# Patient Record
Sex: FEMALE | Race: WHITE | NOT HISPANIC OR LATINO | Employment: UNEMPLOYED | URBAN - METROPOLITAN AREA
[De-identification: names, ages, dates, MRNs, and addresses within clinical notes are randomized per-mention and may not be internally consistent; named-entity substitution may affect disease eponyms.]

---

## 2017-03-14 ENCOUNTER — OFFICE VISIT (OUTPATIENT)
Dept: FAMILY MEDICINE | Facility: CLINIC | Age: 37
End: 2017-03-14
Payer: COMMERCIAL

## 2017-03-14 VITALS
DIASTOLIC BLOOD PRESSURE: 60 MMHG | SYSTOLIC BLOOD PRESSURE: 100 MMHG | BODY MASS INDEX: 21.22 KG/M2 | OXYGEN SATURATION: 96 % | TEMPERATURE: 98 F | HEIGHT: 64 IN | HEART RATE: 79 BPM | WEIGHT: 124.31 LBS | RESPIRATION RATE: 17 BRPM

## 2017-03-14 DIAGNOSIS — J32.9 SINUSITIS, UNSPECIFIED CHRONICITY, UNSPECIFIED LOCATION: Primary | ICD-10-CM

## 2017-03-14 PROCEDURE — 1160F RVW MEDS BY RX/DR IN RCRD: CPT | Mod: S$GLB,,, | Performed by: NURSE PRACTITIONER

## 2017-03-14 PROCEDURE — 99213 OFFICE O/P EST LOW 20 MIN: CPT | Mod: S$GLB,,, | Performed by: NURSE PRACTITIONER

## 2017-03-14 PROCEDURE — 99999 PR PBB SHADOW E&M-EST. PATIENT-LVL III: CPT | Mod: PBBFAC,,, | Performed by: NURSE PRACTITIONER

## 2017-03-14 RX ORDER — GUAIFENESIN 1200 MG/1
1 TABLET, EXTENDED RELEASE ORAL 2 TIMES DAILY
Qty: 20 TABLET | Refills: 0 | COMMUNITY
Start: 2017-03-14 | End: 2019-04-18

## 2017-03-14 RX ORDER — CLARITHROMYCIN 500 MG/1
500 TABLET, FILM COATED ORAL 2 TIMES DAILY
Qty: 20 TABLET | Refills: 0 | Status: SHIPPED | OUTPATIENT
Start: 2017-03-14 | End: 2017-03-24

## 2017-03-14 RX ORDER — LEVOCETIRIZINE DIHYDROCHLORIDE 5 MG/1
5 TABLET, FILM COATED ORAL NIGHTLY
Qty: 30 TABLET | Refills: 11 | Status: SHIPPED | OUTPATIENT
Start: 2017-03-14 | End: 2018-12-20

## 2017-03-14 NOTE — PROGRESS NOTES
Subjective:       Patient ID: Vandana Cobos is a 36 y.o. female.    Chief Complaint: Sinusitis    HPI Comments: Lipid Panel due on 1981  TETANUS VACCINE due on 10/08/1999  Influenza Vaccine due on 08/01/2016    Past Medical History:  Past Medical History:  No date: ALLERGIC RHINITIS  Past Surgical History:  No date: WISDOM TOOTH EXTRACTION  Review of patient's allergies indicates:  No Known Allergies  Current Outpatient Prescriptions on File Prior to Visit:  fluticasone (FLONASE) 50 mcg/actuation nasal spray, 1 spray by Each Nare route once daily., Disp: 16 g, Rfl: 0  MV-MN/IRON FUM/FA/OMEGA3,6,9#3 (WOMEN'S MULTI ORAL), Take by mouth., Disp: , Rfl:     No current facility-administered medications on file prior to visit.     Social History    Marital status:              Spouse name:                       Years of education:                 Number of children:               Occupational History  Occupation          Employer            Comment               [employer]     Social History Main Topics    Smoking status: Never Smoker                                                                Smokeless status: Never Used                        Alcohol use: No              Drug use: No              Sexual activity: Yes               Partners with: Male       Birth control/protection: None, Condom    Other Topics            Concern    None on file    Social History Narrative    None on file      Review of patient's family history indicates:    Heart failure                  Paternal Grandmother      Stroke                         Paternal Grandmother      Uterine cancer                 Maternal Grandmother      Cancer                         Maternal Grandmother        Comment: uterine    Heart disease                  Paternal Grandfather      Cancer                         Maternal Grandfather        Comment: lung    Glaucoma                       Maternal Grandfather       Breast cancer                  Neg Hx                    Ovarian cancer                 Neg Hx                    Amblyopia                      Neg Hx                    Blindness                      Neg Hx                    Cataracts                      Neg Hx                    Diabetes                       Neg Hx                    Hypertension                   Neg Hx                    Macular degeneration           Neg Hx                    Retinal detachment             Neg Hx                    Strabismus                     Neg Hx                    Thyroid disease                Neg Hx                    No Known Problems              Father                    No Known Problems              Mother                    No Known Problems              Sister                    No Known Problems              Brother                   No Known Problems              Maternal Aunt             No Known Problems              Maternal Uncle            No Known Problems              Paternal Aunt             No Known Problems              Paternal Uncle                  Sinusitis   This is a recurrent (last treated in December and January. ) problem. Episode onset: x 10 days. The problem has been gradually worsening since onset. The maximum temperature recorded prior to her arrival was 100.4 - 100.9 F. The pain is mild. Associated symptoms include congestion, coughing, ear pain, headaches, sinus pressure and sneezing. Pertinent negatives include no chills, diaphoresis, hoarse voice, shortness of breath, sore throat or swollen glands. Treatments tried: zyrtec, dayquil, mucinex. The treatment provided mild relief.     Review of Systems   Constitutional: Negative for chills, diaphoresis, fever and weight loss.   HENT: Positive for congestion, ear pain, postnasal drip, rhinorrhea, sinus pressure and sneezing. Negative for hoarse voice and sore throat.    Respiratory: Positive for cough. Negative for hemoptysis,  shortness of breath and wheezing.    Cardiovascular: Negative.  Negative for chest pain.   Gastrointestinal: Negative.  Negative for heartburn.   Musculoskeletal: Negative for myalgias.   Skin: Negative for rash.   Allergic/Immunologic: Positive for environmental allergies.   Neurological: Positive for headaches.       Objective:      Physical Exam   Constitutional: She is oriented to person, place, and time. No distress.   HENT:   Head: Head is without raccoon's eyes.   Right Ear: A middle ear effusion is present.   Left Ear: A middle ear effusion is present.   Nose: Mucosal edema and rhinorrhea present. Right sinus exhibits maxillary sinus tenderness. Right sinus exhibits no frontal sinus tenderness. Left sinus exhibits maxillary sinus tenderness. Left sinus exhibits no frontal sinus tenderness.   Mouth/Throat: Uvula is midline. No dental abscesses or dental caries. Posterior oropharyngeal erythema present.   Eyes: Pupils are equal, round, and reactive to light.   Cardiovascular: Normal rate and regular rhythm.  Exam reveals no friction rub.    No murmur heard.  Pulmonary/Chest: Effort normal and breath sounds normal. No respiratory distress. She has no wheezes.   Abdominal: Soft. Bowel sounds are normal. She exhibits no distension. There is no tenderness.   Musculoskeletal: She exhibits no edema.   Neurological: She is alert and oriented to person, place, and time.   Skin: She is not diaphoretic. No erythema. No pallor.   Psychiatric: She has a normal mood and affect. Her behavior is normal.       Assessment:       1. Sinusitis, unspecified chronicity, unspecified location        Plan:       1. Sinusitis, unspecified chronicity, unspecified location  Follow up if not resolving.   - clarithromycin (BIAXIN) 500 MG tablet; Take 1 tablet (500 mg total) by mouth 2 (two) times daily.  Dispense: 20 tablet; Refill: 0  - levocetirizine (XYZAL) 5 MG tablet; Take 1 tablet (5 mg total) by mouth every evening.  Dispense: 30  tablet; Refill: 11

## 2017-03-14 NOTE — MR AVS SNAPSHOT
[facility]  [address]  [facility] [address]  Phone: [phone]  Fax: [phone]                  Vandana Cobos   3/14/2017 9:40 AM   Office Visit    Description:  Female : 1980   Provider:  Sydnee Monteiro NP   Department:  [facility]           Reason for Visit     Sinusitis           Diagnoses this Visit        Comments    Sinusitis, unspecified chronicity, unspecified location    -  Primary            To Do List           Goals (5 Years of Data)     None       These Medications        Disp Refills Start End    clarithromycin (BIAXIN) 500 MG tablet 20 tablet 0 3/14/2017 3/24/2017    Take 1 tablet (500 mg total) by mouth 2 (two) times daily. - Oral    Pharmacy: [facility] [address] [place] [address] [id number] [place] [address] Ph #: [phone]       levocetirizine (XYZAL) 5 MG tablet 30 tablet 11 3/14/2017 3/14/2018    Take 1 tablet (5 mg total) by mouth every evening. - Oral    Pharmacy: [facility] [address] [place] [id number] [facility] Ph #: [phone]         PURCHASE these Medications (No prescription required)        Start End    guaifenesin (MUCINEX) 1,200 mg Ta12 3/14/2017     Sig - Route: Take 1 tablet by mouth 2 (two) times daily. - Oral    Class: OTC      [facility]     [facility] Nurse Care Line -  Assistance  Registered nurses in the [facility] Call Center provide clinical advisement, health education, appointment booking, and other advisory services.  Call for this free service at [phone].             Medications           Message regarding Medications     Verify the changes and/or additions to your medication regime listed below are the same as discussed with your clinician today.  If any of these changes or additions are incorrect, please notify your healthcare provider.        START taking these NEW medications        Refills    clarithromycin (BIAXIN) 500 MG tablet 0    Sig: Take 1 tablet (500 mg  "total) by mouth 2 (two) times daily.    Class: Normal    Route: Oral    levocetirizine (XYZAL) 5 MG tablet 11    Sig: Take 1 tablet (5 mg total) by mouth every evening.    Class: Normal    Route: Oral    guaifenesin (MUCINEX) 1,200 mg Ta12 0    Sig: Take 1 tablet by mouth 2 (two) times daily.    Class: OTC    Route: Oral           Verify that the below list of medications is an accurate representation of the medications you are currently taking.  If none reported, the list may be blank. If incorrect, please contact your healthcare provider. Carry this list with you in case of emergency.           Current Medications     fluticasone (FLONASE) 50 mcg/actuation nasal spray 1 spray by Each Nare route once daily.    MV-MN/IRON FUM/FA/OMEGA3,6,9#3 (WOMEN'S MULTI ORAL) Take by mouth.    clarithromycin (BIAXIN) 500 MG tablet Take 1 tablet (500 mg total) by mouth 2 (two) times daily.    guaifenesin (MUCINEX) 1,200 mg Ta12 Take 1 tablet by mouth 2 (two) times daily.    levocetirizine (XYZAL) 5 MG tablet Take 1 tablet (5 mg total) by mouth every evening.           Clinical Reference Information           Your Vitals Were     BP Pulse Temp Resp Height Weight    100/60 (BP Location: Left arm, Patient Position: Sitting, BP Method: Manual) 79 97.8 °F (36.6 °C) 17 5' 4" (1.626 m) 56.4 kg (124 lb 5.4 oz)    SpO2 BMI             96% 21.34 kg/m2         Blood Pressure          Most Recent Value    BP  100/60      Allergies as of 3/14/2017     No Known Allergies      Immunizations Administered on Date of Encounter - 3/14/2017     None      Language Assistance Services     ATTENTION: Language assistance services are available, free of charge. Please call [phone].      ATENCIÓN: Si johnla munir, tiene a hammond disposición servicios gratuitos de asistencia lingüística. Llame al [phone].     CHÚ Ý: N?u b?n nói Ti?ng Vi?t, có các d?ch v? h? tr? ngôn ng? mi?n phí dành cho b?n. G?i s? [phone].         [facility] complies with applicable Federal civil rights laws and does not discriminate on the basis of race, color, national origin, age, disability, or sex.

## 2017-03-31 ENCOUNTER — HOSPITAL ENCOUNTER (OUTPATIENT)
Dept: RADIOLOGY | Facility: HOSPITAL | Age: 37
Discharge: HOME | End: 2017-03-31
Attending: NURSE PRACTITIONER
Payer: COMMERCIAL

## 2017-03-31 ENCOUNTER — OFFICE VISIT (OUTPATIENT)
Dept: FAMILY MEDICINE | Facility: CLINIC | Age: 37
End: 2017-03-31
Payer: COMMERCIAL

## 2017-03-31 VITALS
HEIGHT: 64 IN | DIASTOLIC BLOOD PRESSURE: 48 MMHG | SYSTOLIC BLOOD PRESSURE: 94 MMHG | HEART RATE: 80 BPM | RESPIRATION RATE: 16 BRPM | BODY MASS INDEX: 20.85 KG/M2 | TEMPERATURE: 99 F | WEIGHT: 122.13 LBS

## 2017-03-31 DIAGNOSIS — J40 BRONCHITIS: Primary | ICD-10-CM

## 2017-03-31 DIAGNOSIS — J40 BRONCHITIS: ICD-10-CM

## 2017-03-31 PROCEDURE — 1160F RVW MEDS BY RX/DR IN RCRD: CPT | Mod: S$GLB,,, | Performed by: NURSE PRACTITIONER

## 2017-03-31 PROCEDURE — 71020 XR CHEST PA AND LATERAL: CPT | Mod: 26,,, | Performed by: RADIOLOGY

## 2017-03-31 PROCEDURE — 99999 PR PBB SHADOW E&M-EST. PATIENT-LVL III: CPT | Mod: PBBFAC,,, | Performed by: NURSE PRACTITIONER

## 2017-03-31 PROCEDURE — 99213 OFFICE O/P EST LOW 20 MIN: CPT | Mod: S$GLB,,, | Performed by: NURSE PRACTITIONER

## 2017-03-31 PROCEDURE — 71020 XR CHEST PA AND LATERAL: CPT | Mod: TC,PO

## 2017-03-31 RX ORDER — ALBUTEROL SULFATE 90 UG/1
2 AEROSOL, METERED RESPIRATORY (INHALATION) EVERY 6 HOURS PRN
Qty: 18 G | Refills: 0 | Status: SHIPPED | OUTPATIENT
Start: 2017-03-31 | End: 2018-08-29

## 2017-03-31 RX ORDER — METHYLPREDNISOLONE 4 MG/1
TABLET ORAL
Qty: 1 PACKAGE | Refills: 0 | Status: SHIPPED | OUTPATIENT
Start: 2017-03-31 | End: 2017-04-21

## 2017-03-31 NOTE — MR AVS SNAPSHOT
[facility]  [address] [place] [address]  Phone: [phone]  Fax: [phone]                  Vandana Cobos   3/31/2017 9:40 AM   Office Visit    Description:  Female : 1980   Provider:  Sydnee Monteiro NP   Department:  [facility]           Reason for Visit     Cough     Nasal Congestion     Chest Congestion     Fever     Generalized Body Aches           Diagnoses this Visit        Comments    Bronchitis    -  Primary            To Do List           Future Appointments        Provider Department Dept Phone    3/31/2017 10:15 AM [facility] [phone]      Goals (5 Years of Data)     None       These Medications        Disp Refills Start End    methylPREDNISolone (MEDROL DOSEPACK) 4 mg tablet 1 Package 0 3/31/2017 2017    use as directed    Pharmacy: [facility] [address] [place] [address] [facility] Ph #: [phone]       albuterol 90 mcg/actuation inhaler 18 g 0 3/31/2017     Inhale 2 puffs into the lungs every 6 (six) hours as needed. - Inhalation    Pharmacy: [facility] [address] [place] [address] [facility] Ph #: [phone]         [facility]     [facility] Nurse Care Line - 24/ Assistance  Unless otherwise directed by your provider, please contact [facility], our nurse care line that is available for 24/7 assistance.     Registered nurses in the [facility] Center provide: appointment scheduling, clinical advisement, health education, and other advisory services.  Call: [phone] (toll free)               Medications           Message regarding Medications     Verify the changes and/or additions to your medication regime listed below are the same as discussed with your clinician today.  If any of these changes or additions are incorrect, please notify your healthcare provider.        START taking these NEW medications        Refills     "methylPREDNISolone (MEDROL DOSEPACK) 4 mg tablet 0    Sig: use as directed    Class: Normal    albuterol 90 mcg/actuation inhaler 0    Sig: Inhale 2 puffs into the lungs every 6 (six) hours as needed.    Class: Normal    Route: Inhalation           Verify that the below list of medications is an accurate representation of the medications you are currently taking.  If none reported, the list may be blank. If incorrect, please contact your healthcare provider. Carry this list with you in case of emergency.           Current Medications     DM/P-EPHED/ACETAMINOPH/DOXYLAM (VICKS NYQUIL LIQUICAPS ORAL) Take by mouth.    guaifenesin (MUCINEX) 1,200 mg Ta12 Take 1 tablet by mouth 2 (two) times daily.    MV-MN/IRON FUM/FA/OMEGA3,6,9#3 (WOMEN'S MULTI ORAL) Take by mouth.    PSEUDOEPH/DM/GUAIFEN/ACETAMIN (VICKS DAYQUIL LIQUICAPS ORAL) Take by mouth.    albuterol 90 mcg/actuation inhaler Inhale 2 puffs into the lungs every 6 (six) hours as needed.    fluticasone (FLONASE) 50 mcg/actuation nasal spray 1 spray by Each Nare route once daily.    levocetirizine (XYZAL) 5 MG tablet Take 1 tablet (5 mg total) by mouth every evening.    methylPREDNISolone (MEDROL DOSEPACK) 4 mg tablet use as directed           Clinical Reference Information           Your Vitals Were     BP Pulse Temp Resp Height Weight    94/48 80 98.9 °F (37.2 °C) (Oral) 16 5' 4" (1.626 m) 55.4 kg (122 lb 2.2 oz)    Last Period BMI             03/08/2017 20.96 kg/m2         Blood Pressure          Most Recent Value    BP  (!)  94/48      Allergies as of 3/31/2017     No Known Allergies      Immunizations Administered on Date of Encounter - 3/31/2017     None      Orders Placed During Today's Visit     Future Labs/Procedures Expected by Expires    X-Ray Chest PA And Lateral  3/31/2017 3/31/2018      Language Assistance Services     ATTENTION: Language assistance services are available, free of charge. Please call [phone].      ATENCIÓN: Si shefali mejia " a hammond disposición servicios gratuitos de asistencia lingüística. Lllorranie al [phone].     MARY Ý: N?u b?n nói Ti?ng Vi?t, có các d?ch v? h? tr? ngôn ng? mi?n phí dành cho b?n. G?i s? [phone].         [facility] complies with applicable Federal civil rights laws and does not discriminate on the basis of race, color, national origin, age, disability, or sex.

## 2017-03-31 NOTE — PROGRESS NOTES
Subjective:       Patient ID: Vandana Cobos is a 36 y.o. female.    Chief Complaint: Cough; Nasal Congestion (patient took dayquil ); Chest Congestion; Fever; and Generalized Body Aches    HPI Comments: Patient was treated on 3/17/17 with biaxin and xyzal. Sinus symptoms improved but the cough did not resolve. Yesterday she started to have low grade fever and worsening cough.  Having a productive cough and chest tightness. She is currently taking dayquil and nyquil and mucinex.  Lipid Panel due on 1981  TETANUS VACCINE due on 10/08/1999  Influenza Vaccine due on 08/01/2016    Past Medical History:  Past Medical History:  No date: ALLERGIC RHINITIS  Past Surgical History:  No date: WISDOM TOOTH EXTRACTION  Review of patient's allergies indicates:  No Known Allergies  Current Outpatient Prescriptions on File Prior to Visit:  guaifenesin (MUCINEX) 1,200 mg Ta12, Take 1 tablet by mouth 2 (two) times daily., Disp: 20 tablet, Rfl: 0  MV-MN/IRON FUM/FA/OMEGA3,6,9#3 (WOMEN'S MULTI ORAL), Take by mouth., Disp: , Rfl:   fluticasone (FLONASE) 50 mcg/actuation nasal spray, 1 spray by Each Nare route once daily., Disp: 16 g, Rfl: 0  levocetirizine (XYZAL) 5 MG tablet, Take 1 tablet (5 mg total) by mouth every evening., Disp: 30 tablet, Rfl: 11    No current facility-administered medications on file prior to visit.     Social History    Marital status:              Spouse name:                       Years of education:                 Number of children:               Occupational History  Occupation          Employer            Comment               [employer]* [employer]*     Social History Main Topics    Smoking status: Never Smoker                                                                Smokeless status: Never Used                        Alcohol use: No              Drug use: No              Sexual activity: Yes               Partners with: Male       Birth control/protection: None,  Condom    Other Topics            Concern    None on file    Social History Narrative    None on file      Review of patient's family history indicates:    Heart failure                  Paternal Grandmother      Stroke                         Paternal Grandmother      Uterine cancer                 Maternal Grandmother      Cancer                         Maternal Grandmother        Comment: uterine    Heart disease                  Paternal Grandfather      Cancer                         Maternal Grandfather        Comment: lung    Glaucoma                       Maternal Grandfather      Breast cancer                  Neg Hx                    Ovarian cancer                 Neg Hx                    Amblyopia                      Neg Hx                    Blindness                      Neg Hx                    Cataracts                      Neg Hx                    Diabetes                       Neg Hx                    Hypertension                   Neg Hx                    Macular degeneration           Neg Hx                    Retinal detachment             Neg Hx                    Strabismus                     Neg Hx                    Thyroid disease                Neg Hx                    No Known Problems              Father                    No Known Problems              Mother                    No Known Problems              Sister                    No Known Problems              Brother                   No Known Problems              Maternal Aunt             No Known Problems              Maternal Uncle            No Known Problems              Paternal Aunt             No Known Problems              Paternal Uncle                  Review of Systems   Constitutional: Positive for chills, diaphoresis, fatigue and fever.   HENT: Positive for postnasal drip and sore throat. Negative for rhinorrhea, sinus pressure and trouble swallowing.    Respiratory: Positive for cough and chest tightness.  Negative for apnea, choking, shortness of breath, wheezing and stridor.    Cardiovascular: Negative.    Gastrointestinal: Positive for diarrhea. Negative for abdominal pain, constipation, nausea and vomiting.   Neurological: Negative for headaches.       Objective:      Physical Exam   Constitutional: She is oriented to person, place, and time. No distress.   HENT:   Head: Normocephalic and atraumatic. Head is without raccoon's eyes.   Right Ear: No middle ear effusion.   Left Ear:  No middle ear effusion.   Nose: Rhinorrhea present. No mucosal edema. Right sinus exhibits no maxillary sinus tenderness and no frontal sinus tenderness. Left sinus exhibits no maxillary sinus tenderness and no frontal sinus tenderness.   Eyes: Pupils are equal, round, and reactive to light.   Neck: Normal range of motion.   Cardiovascular: Normal rate and regular rhythm.  Exam reveals no friction rub.    No murmur heard.  Pulmonary/Chest: Effort normal and breath sounds normal. No respiratory distress. She has no wheezes.   Abdominal: Soft. Bowel sounds are normal. She exhibits no distension. There is no tenderness.   Musculoskeletal: She exhibits no edema.   Neurological: She is alert and oriented to person, place, and time.   Skin: She is not diaphoretic. No erythema.   Psychiatric: She has a normal mood and affect. Her behavior is normal.   Vitals reviewed.      Assessment:       1. Bronchitis        Plan:       1. Bronchitis  Continue flonase, continue mucinex, follow up if not resolving.   - X-Ray Chest PA And Lateral; Future  - methylPREDNISolone (MEDROL DOSEPACK) 4 mg tablet; use as directed  Dispense: 1 Package; Refill: 0  - albuterol 90 mcg/actuation inhaler; Inhale 2 puffs into the lungs every 6 (six) hours as needed.  Dispense: 18 g; Refill: 0

## 2017-04-04 ENCOUNTER — TELEPHONE (OUTPATIENT)
Dept: FAMILY MEDICINE | Facility: CLINIC | Age: 37
End: 2017-04-04

## 2017-04-04 NOTE — TELEPHONE ENCOUNTER
Pt has seen angela the last couple of visits for sinusitis. Was put on biaxin the first time and then on steroid pack she only wants to see a md.   i offered her an appointment with dr. Mendieta in [place] she could not make that time. She said she now has goop in the corner of her eyes and her eyes are pink. I made her an appointment with you for tomorrow.   She has another day of steroid pack left.   She is tearful on the phone. She said that both of her children have been sick with the h flu. They are better.

## 2017-04-04 NOTE — TELEPHONE ENCOUNTER
----- Message from Janet Redman sent at 4/4/2017  7:06 AM CDT -----  Contact: self    [phone]  Patient requesting to see the doctor only for poss eye infection.Patient saw the NP twice is not feeling better.  Thanks!

## 2017-04-05 ENCOUNTER — OFFICE VISIT (OUTPATIENT)
Dept: FAMILY MEDICINE | Facility: CLINIC | Age: 37
End: 2017-04-05
Payer: COMMERCIAL

## 2017-04-05 VITALS
SYSTOLIC BLOOD PRESSURE: 96 MMHG | BODY MASS INDEX: 20.67 KG/M2 | WEIGHT: 121.06 LBS | HEIGHT: 64 IN | RESPIRATION RATE: 16 BRPM | DIASTOLIC BLOOD PRESSURE: 58 MMHG | HEART RATE: 67 BPM

## 2017-04-05 DIAGNOSIS — J32.9 SINUSITIS, UNSPECIFIED CHRONICITY, UNSPECIFIED LOCATION: Primary | ICD-10-CM

## 2017-04-05 DIAGNOSIS — H10.9 CONJUNCTIVITIS OF RIGHT EYE, UNSPECIFIED CONJUNCTIVITIS TYPE: ICD-10-CM

## 2017-04-05 PROCEDURE — 99999 PR PBB SHADOW E&M-EST. PATIENT-LVL III: CPT | Mod: PBBFAC,,, | Performed by: FAMILY MEDICINE

## 2017-04-05 PROCEDURE — 1160F RVW MEDS BY RX/DR IN RCRD: CPT | Mod: S$GLB,,, | Performed by: FAMILY MEDICINE

## 2017-04-05 PROCEDURE — 99213 OFFICE O/P EST LOW 20 MIN: CPT | Mod: S$GLB,,, | Performed by: FAMILY MEDICINE

## 2017-04-05 RX ORDER — AMOXICILLIN AND CLAVULANATE POTASSIUM 875; 125 MG/1; MG/1
1 TABLET, FILM COATED ORAL 2 TIMES DAILY
Qty: 20 TABLET | Refills: 0 | Status: SHIPPED | OUTPATIENT
Start: 2017-04-05 | End: 2017-10-23 | Stop reason: ALTCHOICE

## 2017-04-05 RX ORDER — TOBRAMYCIN 3 MG/ML
1 SOLUTION/ DROPS OPHTHALMIC 4 TIMES DAILY
Qty: 5 ML | Refills: 0 | Status: SHIPPED | OUTPATIENT
Start: 2017-04-05 | End: 2017-04-12

## 2017-04-05 NOTE — PROGRESS NOTES
Subjective:     THIS DOCUMENT WAS MADE IN PART WITH [employer]ATION SOFTWARE.  OCCASIONALLY THIS SOFTWARE WILL MISINTERPRET WORDS OR PHRASES.   Patient ID: Vandana Cboos is a 36 y.o. female.    Chief Complaint: Conjunctivitis (right eye); Cough; and Sore Throat    HPI     Recurring sinus infections since December    Most recent last Thursday fever (subjective), body aches, headaches  Mostly chest congestion and coughing  Right eye red with purulent drainage  Dayquil and mucinex  Completing steroid pack today  Kids sick suspect H.flu with OM and conjunctivitis    She's also had some cough and chest congestion with green and yellow sputum.    She does have an underlying history of ALLERGIES.  She has been taken and histamine but has not been using Flonase.    Active Ambulatory Problems     Diagnosis Date Noted    Ovarian cyst, left 10/11/2012    Anemia 11/07/2012    UTI (lower urinary tract infection)-lacto first trimester  11/07/2012    Glaucoma, suspect - Both Eyes 08/29/2013    Breast mass, right 09/04/2013     Resolved Ambulatory Problems     Diagnosis Date Noted    Normal pregnancy, first 10/11/2012    Large for gestational age- (7lbs 8 oz on usg 5/22) 04/26/2013    GBS (group B Streptococcus carrier), +RV culture, currently pregnant 05/15/2013     Past Medical History:   Diagnosis Date    ALLERGIC RHINITIS          Review of Systems   Constitutional: Positive for fatigue and fever.   HENT: Positive for congestion, postnasal drip, sinus pressure and sneezing.    Respiratory: Positive for cough.    Cardiovascular: Negative for chest pain.   Genitourinary: Negative.    Musculoskeletal: Negative.        Objective:      Physical Exam   Constitutional: She is oriented to person, place, and time. She appears well-developed and well-nourished.   HENT:   Head: Normocephalic and atraumatic. Head is without right periorbital erythema and without left periorbital erythema.   Right Ear: Tympanic membrane, external  ear and ear canal normal. No drainage.   Left Ear: Tympanic membrane, external ear and ear canal normal. No drainage.   Nose: Mucosal edema (Inflamed turbinates) present.   Mouth/Throat: Uvula is midline and oropharynx is clear and moist. No oropharyngeal exudate or posterior oropharyngeal erythema.   Eyes: EOM are normal. Pupils are equal, round, and reactive to light. Left eye exhibits no discharge. Right conjunctiva is injected. Left conjunctiva is not injected. No scleral icterus.   Neck: Normal range of motion. Neck supple. No tracheal deviation present. No thyromegaly present.   Cardiovascular: Normal rate, regular rhythm and normal heart sounds.  Exam reveals no gallop and no friction rub.    No murmur heard.  Pulmonary/Chest: Effort normal and breath sounds normal. No stridor. No respiratory distress. She has no wheezes. She has no rales.   Lymphadenopathy:     She has no cervical adenopathy.   Neurological: She is alert and oriented to person, place, and time.   Skin: Skin is warm and dry. She is not diaphoretic.   Psychiatric: She has a normal mood and affect. Her behavior is normal.   Vitals reviewed.      Assessment:       1. Sinusitis, unspecified chronicity, unspecified location    2. Conjunctivitis of right eye, unspecified conjunctivitis type        Plan:       Vandana was seen today for conjunctivitis, cough and sore throat.    Diagnoses and all orders for this visit:    Sinusitis, unspecified chronicity, unspecified location  -     amoxicillin-clavulanate 875-125mg (AUGMENTIN) 875-125 mg per tablet; Take 1 tablet by mouth 2 (two) times daily.    Conjunctivitis of right eye, unspecified conjunctivitis type  -     tobramycin sulfate 0.3% (TOBREX) 0.3 % ophthalmic solution; Place 1 drop into both eyes 4 (four) times daily.     restart Flonase for her ALLERGIES

## 2017-04-06 ENCOUNTER — PATIENT MESSAGE (OUTPATIENT)
Dept: FAMILY MEDICINE | Facility: CLINIC | Age: 37
End: 2017-04-06

## 2017-04-15 ENCOUNTER — PATIENT MESSAGE (OUTPATIENT)
Dept: FAMILY MEDICINE | Facility: CLINIC | Age: 37
End: 2017-04-15

## 2017-05-15 ENCOUNTER — PATIENT MESSAGE (OUTPATIENT)
Dept: FAMILY MEDICINE | Facility: CLINIC | Age: 37
End: 2017-05-15

## 2017-05-16 RX ORDER — ESCITALOPRAM OXALATE 5 MG/1
5 TABLET ORAL DAILY
Qty: 30 TABLET | Refills: 2 | Status: SHIPPED | OUTPATIENT
Start: 2017-05-16 | End: 2017-08-07 | Stop reason: SDUPTHER

## 2017-08-07 ENCOUNTER — PATIENT MESSAGE (OUTPATIENT)
Dept: FAMILY MEDICINE | Facility: CLINIC | Age: 37
End: 2017-08-07

## 2017-08-07 RX ORDER — ESCITALOPRAM OXALATE 10 MG/1
10 TABLET ORAL DAILY
Qty: 30 TABLET | Refills: 2 | Status: SHIPPED | OUTPATIENT
Start: 2017-08-07 | End: 2017-10-23 | Stop reason: SDUPTHER

## 2017-10-23 ENCOUNTER — OFFICE VISIT (OUTPATIENT)
Dept: FAMILY MEDICINE | Facility: CLINIC | Age: 37
End: 2017-10-23
Payer: COMMERCIAL

## 2017-10-23 ENCOUNTER — LAB VISIT (OUTPATIENT)
Dept: LAB | Facility: HOSPITAL | Age: 37
End: 2017-10-23
Attending: FAMILY MEDICINE
Payer: COMMERCIAL

## 2017-10-23 VITALS
HEART RATE: 76 BPM | WEIGHT: 126.56 LBS | DIASTOLIC BLOOD PRESSURE: 58 MMHG | BODY MASS INDEX: 21.61 KG/M2 | SYSTOLIC BLOOD PRESSURE: 90 MMHG | HEIGHT: 64 IN | TEMPERATURE: 98 F

## 2017-10-23 DIAGNOSIS — L65.9 HAIR LOSS: ICD-10-CM

## 2017-10-23 DIAGNOSIS — M79.672 PAIN OF LEFT HEEL: ICD-10-CM

## 2017-10-23 DIAGNOSIS — Z83.49 FAMILY HISTORY OF THYROID DISEASE: ICD-10-CM

## 2017-10-23 DIAGNOSIS — F41.9 ANXIETY: ICD-10-CM

## 2017-10-23 DIAGNOSIS — J30.9 ALLERGIC RHINITIS, UNSPECIFIED CHRONICITY, UNSPECIFIED SEASONALITY, UNSPECIFIED TRIGGER: Primary | ICD-10-CM

## 2017-10-23 LAB — TSH SERPL DL<=0.005 MIU/L-ACNC: 2.42 UIU/ML

## 2017-10-23 PROCEDURE — 84443 ASSAY THYROID STIM HORMONE: CPT

## 2017-10-23 PROCEDURE — 90471 IMMUNIZATION ADMIN: CPT | Mod: S$GLB,,, | Performed by: FAMILY MEDICINE

## 2017-10-23 PROCEDURE — 99214 OFFICE O/P EST MOD 30 MIN: CPT | Mod: 25,S$GLB,, | Performed by: FAMILY MEDICINE

## 2017-10-23 PROCEDURE — 36415 COLL VENOUS BLD VENIPUNCTURE: CPT | Mod: PO

## 2017-10-23 PROCEDURE — 90686 IIV4 VACC NO PRSV 0.5 ML IM: CPT | Mod: S$GLB,,, | Performed by: FAMILY MEDICINE

## 2017-10-23 PROCEDURE — 99999 PR PBB SHADOW E&M-EST. PATIENT-LVL III: CPT | Mod: PBBFAC,,, | Performed by: FAMILY MEDICINE

## 2017-10-23 RX ORDER — ESCITALOPRAM OXALATE 20 MG/1
20 TABLET ORAL DAILY
Qty: 30 TABLET | Refills: 4 | Status: SHIPPED | OUTPATIENT
Start: 2017-10-23 | End: 2018-05-22 | Stop reason: SDUPTHER

## 2017-10-23 NOTE — PROGRESS NOTES
Subjective:       Patient ID: Vandana Cobos is a 37 y.o. female.    Chief Complaint: Thyroid Problem (pt c/o hair loss for last month and history of thyroid problems in family); Sinus Problem (pt ststaes she had a cold last week and is still experiencing sinus issues); and Foot Injury (pt states she has been having pain L heel lately)    HPI   Sinus congestion, sinus pressure, postnasal drainage.  No fever.  Kids have been sick with similar.    Family history of hypothyroidism.  Has noticed some hair loss recently, appears to be increased shedding diffusely.    Anxiety, remains on Lexapro 10 mg for about 4 weeks.  She noticed improvement when he first began with 5 mg but little change when increasing from 5-10.  No side effects.  No depression.  There has been an increase in stress, recent, but no emergencies here.    Left heel pain for several weeks.  Pain most prominent when she first stands up in the morning and then eases off as she walks around.  No injury or trauma.  Pain is over the bottom of the heel and medial heel.  No pain posteriorly near the Achilles    Active Ambulatory Problems     Diagnosis Date Noted    Ovarian cyst, left 10/11/2012    Anemia 11/07/2012    UTI (lower urinary tract infection)-lacto first trimester  11/07/2012    Glaucoma, suspect - Both Eyes 08/29/2013    Breast mass, right 09/04/2013     Resolved Ambulatory Problems     Diagnosis Date Noted    Normal pregnancy, first 10/11/2012    Large for gestational age- (7lbs 8 oz on usg 5/22) 04/26/2013    GBS (group B Streptococcus carrier), +RV culture, currently pregnant 05/15/2013     Past Medical History:   Diagnosis Date    ALLERGIC RHINITIS          Review of Systems   Constitutional: Positive for fatigue. Negative for fever.   HENT: Positive for congestion, postnasal drip, rhinorrhea, sinus pain and sinus pressure. Negative for sore throat.    Respiratory: Negative for cough, shortness of breath and wheezing.     Psychiatric/Behavioral: Negative for dysphoric mood. The patient is nervous/anxious.        Objective:      Physical Exam   Constitutional: She is oriented to person, place, and time. She appears well-developed and well-nourished.   HENT:   Head: Normocephalic and atraumatic. Head is without right periorbital erythema and without left periorbital erythema.   Right Ear: Tympanic membrane, external ear and ear canal normal. No drainage.   Left Ear: Tympanic membrane, external ear and ear canal normal. No drainage.   Nose: No mucosal edema (Boggy swollen turbinates) or rhinorrhea (Clear).   Mouth/Throat: Uvula is midline and oropharynx is clear and moist. No oropharyngeal exudate or posterior oropharyngeal erythema.   Enlarged boggy turbinates, clear rhinorrhea.   Eyes: Conjunctivae and EOM are normal. Pupils are equal, round, and reactive to light. Right eye exhibits no discharge. Left eye exhibits no discharge. No scleral icterus.   Neck: Normal range of motion. Neck supple. No tracheal deviation present. No thyromegaly present.   Cardiovascular: Normal rate, regular rhythm and normal heart sounds.  Exam reveals no gallop and no friction rub.    No murmur heard.  Pulmonary/Chest: Effort normal and breath sounds normal. No stridor. No respiratory distress. She has no wheezes. She has no rales.   Lymphadenopathy:     She has no cervical adenopathy.   Neurological: She is alert and oriented to person, place, and time. She has normal reflexes. No cranial nerve deficit.   Skin: Skin is warm and dry. She is not diaphoretic.   Psychiatric: She has a normal mood and affect. Her behavior is normal.   Vitals reviewed.      Assessment:       1. Allergic rhinitis, unspecified chronicity, unspecified seasonality, unspecified trigger    2. Hair loss    3. Family history of thyroid disease    4. Anxiety        Plan:       Vandana was seen today for thyroid problem, sinus problem and foot injury.    Diagnoses and all orders for  this visit:    Allergic rhinitis, unspecified chronicity, unspecified seasonality, unspecified trigger  Continue Xyzal, restart Flonase.  2 sprays each nostril twice a day for the first week then once a day.  She is at risk for developing a sinus infection, but I don't recommend antibiotics just yet.  I did ask her to contact me over the next week if not improving.    Hair loss  -     TSH; Future    Family history of thyroid disease  -     TSH; Future    Anxiety  Initially mild benefit from the Lexapro 5 mg.  No additional benefit when increased to 10.  We'll increase to 20 mg.    Pain of left heel  She does have some pain near the insertion of the plantar fascia but also has some medial heel pain which may or may not be related.  Recommend ice, stretching, Naprosyn for one week.  If not improving consider podiatry   Other orders  -     escitalopram oxalate (LEXAPRO) 20 MG tablet; Take 1 tablet (20 mg total) by mouth once daily.  -     Influenza - Quadrivalent (3 years & older) (PF)

## 2017-10-24 ENCOUNTER — TELEPHONE (OUTPATIENT)
Dept: OPHTHALMOLOGY | Facility: CLINIC | Age: 37
End: 2017-10-24

## 2017-11-13 ENCOUNTER — TELEPHONE (OUTPATIENT)
Dept: FAMILY MEDICINE | Facility: CLINIC | Age: 37
End: 2017-11-13

## 2017-11-14 ENCOUNTER — OFFICE VISIT (OUTPATIENT)
Dept: FAMILY MEDICINE | Facility: CLINIC | Age: 37
End: 2017-11-14
Payer: COMMERCIAL

## 2017-11-14 VITALS
SYSTOLIC BLOOD PRESSURE: 104 MMHG | WEIGHT: 125.13 LBS | HEIGHT: 64 IN | OXYGEN SATURATION: 97 % | HEART RATE: 74 BPM | BODY MASS INDEX: 21.36 KG/M2 | TEMPERATURE: 99 F | DIASTOLIC BLOOD PRESSURE: 62 MMHG

## 2017-11-14 DIAGNOSIS — F41.9 ANXIETY: ICD-10-CM

## 2017-11-14 DIAGNOSIS — J01.90 ACUTE NON-RECURRENT SINUSITIS, UNSPECIFIED LOCATION: Primary | ICD-10-CM

## 2017-11-14 DIAGNOSIS — J30.9 ALLERGIC RHINITIS, UNSPECIFIED CHRONICITY, UNSPECIFIED SEASONALITY, UNSPECIFIED TRIGGER: ICD-10-CM

## 2017-11-14 PROCEDURE — 99999 PR PBB SHADOW E&M-EST. PATIENT-LVL III: CPT | Mod: PBBFAC,,, | Performed by: FAMILY MEDICINE

## 2017-11-14 PROCEDURE — 99214 OFFICE O/P EST MOD 30 MIN: CPT | Mod: S$GLB,,, | Performed by: FAMILY MEDICINE

## 2017-11-14 RX ORDER — AZITHROMYCIN 250 MG/1
TABLET, FILM COATED ORAL
Qty: 6 TABLET | Refills: 0 | Status: SHIPPED | OUTPATIENT
Start: 2017-11-14 | End: 2017-11-19

## 2017-11-14 RX ORDER — MOMETASONE FUROATE 50 UG/1
2 SPRAY, METERED NASAL DAILY
Qty: 17 G | Refills: 2 | Status: SHIPPED | OUTPATIENT
Start: 2017-11-14 | End: 2017-12-06

## 2017-12-06 ENCOUNTER — TELEPHONE (OUTPATIENT)
Dept: OPTOMETRY | Facility: CLINIC | Age: 37
End: 2017-12-06

## 2017-12-06 ENCOUNTER — OFFICE VISIT (OUTPATIENT)
Dept: OPTOMETRY | Facility: CLINIC | Age: 37
End: 2017-12-06
Payer: COMMERCIAL

## 2017-12-06 DIAGNOSIS — H40.023 OPEN ANGLE WITH BORDERLINE FINDINGS, HIGH RISK, BILATERAL: Primary | ICD-10-CM

## 2017-12-06 DIAGNOSIS — Z46.0 FITTING AND ADJUSTMENT OF SPECTACLES AND CONTACT LENSES: Primary | ICD-10-CM

## 2017-12-06 DIAGNOSIS — H52.13 MYOPIA OF BOTH EYES: ICD-10-CM

## 2017-12-06 PROCEDURE — 92310 CONTACT LENS FITTING OU: CPT | Mod: ,,, | Performed by: OPTOMETRIST

## 2017-12-06 PROCEDURE — 99999 PR PBB SHADOW E&M-EST. PATIENT-LVL II: CPT | Mod: PBBFAC,,, | Performed by: OPTOMETRIST

## 2017-12-06 PROCEDURE — 92015 DETERMINE REFRACTIVE STATE: CPT | Mod: S$GLB,,, | Performed by: OPTOMETRIST

## 2017-12-06 PROCEDURE — 99499 UNLISTED E&M SERVICE: CPT | Mod: S$GLB,,, | Performed by: OPTOMETRIST

## 2017-12-06 PROCEDURE — 92014 COMPRE OPH EXAM EST PT 1/>: CPT | Mod: S$GLB,,, | Performed by: OPTOMETRIST

## 2017-12-06 NOTE — TELEPHONE ENCOUNTER
----- Message from Estrella Montero sent at 12/6/2017  7:14 AM CST -----  Patient requesting to speak with nurse concerning possibly rescheduling this morning's 9:00 appointment/stated she will still come to appointment if nothing else available before January/please call patient back at [phone] to advise.

## 2017-12-06 NOTE — PROGRESS NOTES
HPI     Routine eye exam with contacts--dle--5/14/16    Pt denies any changes since last visit-no eye pain, no blurred vision, no   flashes or floaters. Needing updated glasses and contacts rx.     Last edited by Melinda Sears on 12/6/2017  9:28 AM. (History)            Assessment /Plan     For exam results, see Encounter Report.    Open angle with borderline findings, high risk, bilateral    Myopia of both eyes - Both Eyes      1. IOP low and stable, OCT normal, pachy thin, Prev HVF normal. High risk suspect. Fam history, cupping and thin pachy. Still No treat at this time. Prev Gonio open,.  RTC yearly with HVF.   2. Spec Rx given and Contact lens Rx released to pt. Daily wear only advised, with education to risks of extended wear.  Discussed lens care, compliance and solutions. RTC yearly contact lens follow up.   Different lens options discussed with patient. RTC 1 year full exam.

## 2018-01-22 ENCOUNTER — OFFICE VISIT (OUTPATIENT)
Dept: FAMILY MEDICINE | Facility: CLINIC | Age: 38
End: 2018-01-22
Payer: COMMERCIAL

## 2018-01-22 VITALS
BODY MASS INDEX: 21.79 KG/M2 | TEMPERATURE: 99 F | SYSTOLIC BLOOD PRESSURE: 100 MMHG | WEIGHT: 127.63 LBS | HEART RATE: 68 BPM | DIASTOLIC BLOOD PRESSURE: 68 MMHG | HEIGHT: 64 IN

## 2018-01-22 DIAGNOSIS — J06.9 VIRAL URI: Primary | ICD-10-CM

## 2018-01-22 PROCEDURE — 99213 OFFICE O/P EST LOW 20 MIN: CPT | Mod: S$GLB,,, | Performed by: INTERNAL MEDICINE

## 2018-01-22 PROCEDURE — 99999 PR PBB SHADOW E&M-EST. PATIENT-LVL III: CPT | Mod: PBBFAC,,, | Performed by: INTERNAL MEDICINE

## 2018-01-22 NOTE — PATIENT INSTRUCTIONS
You have an upper respiratory infection, also known as a cold. Based on my exam today, I do not think that you need antibiotics, and I do not think that they would help you. This type of infection commonly causes runny nose, nasal congestion, sinus pressure, ear pressure, sore throat, and cough.     To help with these symptoms, there are many medications and other treatments available without a prescription. If you choose to take any over the counter cold medicine, read the label, as they can contain many different medications to help with different symptoms. Make sure that any over the counter cold medications are safe to take with medical conditions that you may have.     You can also try a nasal rinse to help with your runny nose, sinus pressure, and congestion. For nasal rinses, you can buy a kit over the counter (I recommend Absio) that allows you to spray salt water up into your nose and sinuses to rinse out mucus, allergens, and irritants. Follow the instructions on whichever kit you buy, paying attention to using distilled, boiled, or bottled water. Tap water can contain a parasite that it dangerous to spray up into the nose. You can try using this twice a day.     If you are having sore throat, you can try gargling with salt water. Heat water to dissolve salt to the point where you cant dissolve any more. Cool the water back to lukewarm and gargle with this solution. This can help with swelling in your throat.

## 2018-01-22 NOTE — PROGRESS NOTES
Assessment and Plan:    1. Viral URI  Patient presenting with 6 days of typical viral URI symptoms. Despite chills and possible fever, she has no signs or symptoms of bacterial superinfection to suggest need for antibiotics. Discussed the reason for not prescribing antibiotics at this time. Discussed symptomatic management with OTC meds and saline rinses (prescription medications as indicated) as in patient instructions. Patient advised to let us know if symptoms persist or if she develops any new or worsening symptoms.          ______________________________________________________________________  Subjective:    Chief Complaint:  URI symptoms    HPI:  Vandana is a 37 y.o. year old woman here for evaluation of URI symptoms.     URI symptoms for the last week, cough and nasal drip  Yesterday started having chills and her  said she felt warm, but she did not take her temperature  Has had some thicker yellow drip from her nose  Has had some headache, but no specific sinus pain or pressure  Denies any ear pain or drainage, no ear pressure  Denies any shortness of breath or wheezing   Reports that she mainly came in for evaluation because she was concerned that the fever yesterday could be a sign that she developed a secondary infection  Has been using OTC generic dayquil and has found this helpful  Notes that for allergic rhinitis a nasal steroid has been recommended, but she has not been using this, mainly due to the taste of flonase and the cost of nasonex     Medications:  Current Outpatient Prescriptions on File Prior to Visit   Medication Sig Dispense Refill    escitalopram oxalate (LEXAPRO) 20 MG tablet Take 1 tablet (20 mg total) by mouth once daily. 30 tablet 4    guaifenesin (MUCINEX) 1,200 mg Ta12 Take 1 tablet by mouth 2 (two) times daily. 20 tablet 0    levocetirizine (XYZAL) 5 MG tablet Take 1 tablet (5 mg total) by mouth every evening. 30 tablet 11    MV-MN/IRON FUM/FA/OMEGA3,6,9#3 (WOMEN'S  "MULTI ORAL) Take by mouth.      PSEUDOEPH/DM/GUAIFEN/ACETAMIN (VICKS DAYQUIL LIQUICAPS ORAL) Take by mouth.      albuterol 90 mcg/actuation inhaler Inhale 2 puffs into the lungs every 6 (six) hours as needed. 18 g 0    DM/P-EPHED/ACETAMINOPH/DOXYLAM (VICKS NYQUIL LIQUICAPS ORAL) Take by mouth.      fluticasone (FLONASE) 50 mcg/actuation nasal spray 1 spray by Each Nare route once daily. 16 g 0     No current facility-administered medications on file prior to visit.        Review of Systems:  Review of Systems   Constitutional: Positive for chills and fatigue.   HENT: Positive for congestion, rhinorrhea and sore throat. Negative for ear discharge, ear pain, sinus pain, sinus pressure and trouble swallowing.    Respiratory: Positive for cough. Negative for shortness of breath and wheezing.    Cardiovascular: Negative for chest pain.   Neurological: Negative for dizziness and light-headedness.       Past Medical History:  Past Medical History:   Diagnosis Date    ALLERGIC RHINITIS        Objective:    Vitals:  Vitals:    01/22/18 1656   BP: 100/68   Pulse: 68   Temp: 98.8 °F (37.1 °C)   TempSrc: Oral   Weight: 57.9 kg (127 lb 10.3 oz)   Height: 5' 4" (1.626 m)   PainSc:   4   PainLoc: Head       Physical Exam   Constitutional: She is oriented to person, place, and time. She appears well-developed and well-nourished. No distress.   HENT:   Right Ear: Tympanic membrane and ear canal normal.   Left Ear: Tympanic membrane and ear canal normal.   Nose: Mucosal edema and rhinorrhea present. No septal deviation. Right sinus exhibits no maxillary sinus tenderness and no frontal sinus tenderness. Left sinus exhibits no maxillary sinus tenderness and no frontal sinus tenderness.   Mouth/Throat: Posterior oropharyngeal erythema present. No posterior oropharyngeal edema. Tonsils are 0 on the right. Tonsils are 0 on the left. No tonsillar exudate.   Eyes: No scleral icterus.   Cardiovascular: Normal rate and regular rhythm.  "   No murmur heard.  Pulmonary/Chest: Effort normal and breath sounds normal. No respiratory distress. She has no wheezes. She has no rales.   Musculoskeletal: She exhibits no edema.   Neurological: She is alert and oriented to person, place, and time.   Skin: Skin is warm and dry.   Psychiatric: She has a normal mood and affect. Her behavior is normal.   Vitals reviewed.         Destiny Garrett MD  Internal Medicine

## 2018-02-12 ENCOUNTER — TELEPHONE (OUTPATIENT)
Dept: FAMILY MEDICINE | Facility: CLINIC | Age: 38
End: 2018-02-12

## 2018-02-12 DIAGNOSIS — J32.9 SINUSITIS, UNSPECIFIED CHRONICITY, UNSPECIFIED LOCATION: ICD-10-CM

## 2018-02-12 RX ORDER — FLUTICASONE PROPIONATE 50 MCG
1 SPRAY, SUSPENSION (ML) NASAL DAILY
Qty: 16 G | Refills: 0 | Status: SHIPPED | OUTPATIENT
Start: 2018-02-12 | End: 2020-01-28 | Stop reason: SDUPTHER

## 2018-02-12 RX ORDER — AMOXICILLIN AND CLAVULANATE POTASSIUM 875; 125 MG/1; MG/1
1 TABLET, FILM COATED ORAL 2 TIMES DAILY
Qty: 20 TABLET | Refills: 0 | Status: SHIPPED | OUTPATIENT
Start: 2018-02-12 | End: 2018-08-29

## 2018-02-12 NOTE — TELEPHONE ENCOUNTER
If symptoms have persisted and worsened then it is reasonable to consider this a sinus infection. I did send in a prescription for Augmentin and Flonase. If this does not clear symptoms then she may need to follow back.

## 2018-02-12 NOTE — TELEPHONE ENCOUNTER
----- Message from Silva Rey sent at 2/12/2018  9:14 AM CST -----  Contact: self  Patient [phone] is calling/she was seen by Dr Garrett on 01 22 18 for a possible sinus infection but Dr Garrett did not give her anything at all/patient still has all the symptoms she previously had and she is asking if Dr Chou could call something to her pharmacy for this?/please call patient to advise      [id number] - [place] - [address]  [address] [place] [address]  Phone: [phone] Fax: [phone]

## 2018-02-12 NOTE — TELEPHONE ENCOUNTER
Spoke with pt, she states she saw Dr. Garrett 1/22/18 for what she thought was a URI.  States she is having a lot of sinus congestion, thick yellow mucous, Pt states it is more on L side than right, denies any productive cough or fever.  Pt states this has been persisting x 3 weeks.  Pt is requesting something called in, please advise.

## 2018-02-14 NOTE — TELEPHONE ENCOUNTER
LM for pt to inform that Rx (augmentin and flonase) were sent to her pharmacy and for her to give us a call back if she had any further questions or concerns.

## 2018-05-21 RX ORDER — ESCITALOPRAM OXALATE 20 MG/1
20 TABLET ORAL DAILY
Qty: 30 TABLET | Refills: 4 | Status: CANCELLED | OUTPATIENT
Start: 2018-05-21 | End: 2019-05-21

## 2018-05-21 RX ORDER — ESCITALOPRAM OXALATE 20 MG/1
TABLET ORAL
Qty: 30 TABLET | Refills: 4 | Status: CANCELLED | OUTPATIENT
Start: 2018-05-21

## 2018-05-21 NOTE — TELEPHONE ENCOUNTER
----- Message from Becka Valentin sent at 5/21/2018  7:36 AM CDT -----  Type:  RX Refill Request    Who Called: Vandana  Refill or New Rx:  Refill  RX Name and Strength: Alexapro  How is the patient currently taking it? (ex. 1XDay):  1Xday  Is this a 30 day or 90 day RX:  30 w refills  Preferred Pharmacy with phone number:    [id number] - [place], [place] - [address] [address]  [address] [address] [address]  Phone: [phone] Fax: [phone]    Local or Mail Order: local  Ordering Provider: pattie Chou Call Back Number:  [phone]  Additional Information:  n/a

## 2018-05-22 ENCOUNTER — PATIENT MESSAGE (OUTPATIENT)
Dept: FAMILY MEDICINE | Facility: CLINIC | Age: 38
End: 2018-05-22

## 2018-05-22 RX ORDER — ESCITALOPRAM OXALATE 20 MG/1
20 TABLET ORAL DAILY
Qty: 30 TABLET | Refills: 2 | Status: SHIPPED | OUTPATIENT
Start: 2018-05-22 | End: 2018-12-27 | Stop reason: SDUPTHER

## 2018-05-22 RX ORDER — ESCITALOPRAM OXALATE 20 MG/1
20 TABLET ORAL DAILY
Qty: 30 TABLET | Refills: 4 | Status: SHIPPED | OUTPATIENT
Start: 2018-05-22 | End: 2018-12-18 | Stop reason: SDUPTHER

## 2018-08-29 ENCOUNTER — OFFICE VISIT (OUTPATIENT)
Dept: FAMILY MEDICINE | Facility: CLINIC | Age: 38
End: 2018-08-29
Payer: COMMERCIAL

## 2018-08-29 ENCOUNTER — TELEPHONE (OUTPATIENT)
Dept: FAMILY MEDICINE | Facility: CLINIC | Age: 38
End: 2018-08-29

## 2018-08-29 VITALS
SYSTOLIC BLOOD PRESSURE: 98 MMHG | DIASTOLIC BLOOD PRESSURE: 52 MMHG | WEIGHT: 131.94 LBS | OXYGEN SATURATION: 98 % | HEART RATE: 74 BPM | HEIGHT: 64 IN | TEMPERATURE: 99 F | BODY MASS INDEX: 22.53 KG/M2

## 2018-08-29 DIAGNOSIS — J32.9 SINUSITIS, UNSPECIFIED CHRONICITY, UNSPECIFIED LOCATION: Primary | ICD-10-CM

## 2018-08-29 PROCEDURE — 99214 OFFICE O/P EST MOD 30 MIN: CPT | Mod: S$GLB,,, | Performed by: FAMILY MEDICINE

## 2018-08-29 PROCEDURE — 99999 PR PBB SHADOW E&M-EST. PATIENT-LVL III: CPT | Mod: PBBFAC,,, | Performed by: FAMILY MEDICINE

## 2018-08-29 PROCEDURE — 3008F BODY MASS INDEX DOCD: CPT | Mod: CPTII,S$GLB,, | Performed by: FAMILY MEDICINE

## 2018-08-29 RX ORDER — METHYLPREDNISOLONE 4 MG/1
TABLET ORAL
Qty: 1 PACKAGE | Refills: 0 | Status: SHIPPED | OUTPATIENT
Start: 2018-08-29 | End: 2018-11-12 | Stop reason: ALTCHOICE

## 2018-08-29 RX ORDER — AMOXICILLIN AND CLAVULANATE POTASSIUM 875; 125 MG/1; MG/1
1 TABLET, FILM COATED ORAL 2 TIMES DAILY
Qty: 20 TABLET | Refills: 0 | Status: SHIPPED | OUTPATIENT
Start: 2018-08-29 | End: 2018-11-12 | Stop reason: ALTCHOICE

## 2018-08-29 NOTE — TELEPHONE ENCOUNTER
Pt states she has had sinus congestion for about 2 weeks, started with her young children.  Requesting to be seen today but if nothing open, tomorrow would be okay.      States she is not having a cough, denies fever, but she is just very congested, and she is having yellow and clear mucus and nasal discharge.      There are no available appointments between [place] and [place] until 9/4 Tuesday.  Advised pt she could go to , but pt states the copay is pretty high at  around $50.       Please give any recommendations, thank you

## 2018-08-29 NOTE — PROGRESS NOTES
" THIS DOCUMENT WAS MADE IN PART WITH VOICE RECOGNITION SOFTWARE.  OCCASIONALLY THIS SOFTWARE WILL MISINTERPRET WORDS OR PHRASES.      Vandana Cobos  1980    Vandana was seen today for sinus problem and nasal congestion.    Diagnoses and all orders for this visit:    Sinusitis, unspecified chronicity, unspecified location  -     amoxicillin-clavulanate 875-125mg (AUGMENTIN) 875-125 mg per tablet; Take 1 tablet by mouth 2 (two) times daily.  -     methylPREDNISolone (MEDROL DOSEPACK) 4 mg tablet; use as directed     she has an acute sinusitis on top of chronic allergies and nasal congestion. She's having severe congestion therefore I do feel the Medrol pack is warranted along with the anabiotic. If symptoms do not improve or if they continue to return you recommend consulting with ENT. I really cannot see very far into the nasal passageways b/c of edema and I cannot rule out significant instruction and/or polyps.    Subjective     Chief Complaint   Patient presents with    Sinus Problem    Nasal Congestion       Sinus Problem   This is a recurrent problem. The current episode started 1 to 4 weeks ago. The problem has been waxing and waning since onset. There has been no fever. Associated symptoms include congestion, coughing, headaches and sinus pressure. Treatments tried: Dayquil, zyrtec, flonase.    she has chronic allergies that wax and wane. Two weeks ago she developed "a cold". Two of her children had similar symptoms but they are better. Her symptoms have persisted now with sinus pain and pressure, color drainage, fatigue. Transient ear pain      Active Ambulatory Problems     Diagnosis Date Noted    Ovarian cyst, left 10/11/2012    Anemia 11/07/2012    UTI (lower urinary tract infection)-lacto first trimester  11/07/2012    Glaucoma, suspect - Both Eyes 08/29/2013    Breast mass, right 09/04/2013     Resolved Ambulatory Problems     Diagnosis Date Noted    Normal pregnancy, first 10/11/2012    " Large for gestational age- (7lbs 8 oz on usg 5/22) 04/26/2013    GBS (group B Streptococcus carrier), +RV culture, currently pregnant 05/15/2013     Past Medical History:   Diagnosis Date    ALLERGIC RHINITIS          Review of Systems   HENT: Positive for congestion, sinus pressure and sinus pain.    Respiratory: Positive for cough.    Cardiovascular: Negative.    Gastrointestinal: Negative.    Musculoskeletal: Negative.    Neurological: Positive for headaches.       Objective     Physical Exam   Constitutional: She is oriented to person, place, and time. She appears well-developed and well-nourished. No distress.   HENT:   Head: Normocephalic and atraumatic.   Right Ear: Tympanic membrane, external ear and ear canal normal.   Left Ear: Tympanic membrane, external ear and ear canal normal.   Nose: Mucosal edema present.   Mouth/Throat: Oropharynx is clear and moist. No oropharyngeal exudate, posterior oropharyngeal edema, posterior oropharyngeal erythema or tonsillar abscesses.   Significant nasal congestion with bilateral green drainage. But on top of that she appears to have significant allergy type appearance pale boggy swollen turbine it's   Eyes: Conjunctivae are normal. Pupils are equal, round, and reactive to light. Right eye exhibits no discharge. Left eye exhibits no discharge. No scleral icterus.   Neck: Normal range of motion. Neck supple.   Cardiovascular: Normal rate, regular rhythm and normal heart sounds.   No murmur heard.  Pulmonary/Chest: Effort normal and breath sounds normal. No stridor. No respiratory distress. She has no wheezes.   Lymphadenopathy:     She has no cervical adenopathy.   Neurological: She is alert and oriented to person, place, and time.   Skin: Skin is dry. No rash noted. She is not diaphoretic.   Psychiatric: She has a normal mood and affect. Her behavior is normal.   Vitals reviewed.    Vitals:    08/29/18 1625   BP: (!) 98/52   BP Location: Left arm   Patient Position:  "Sitting   BP Method: Medium (Manual)   Pulse: 74   Temp: 98.9 °F (37.2 °C)   TempSrc: Oral   SpO2: 98%   Weight: 59.8 kg (131 lb 15.1 oz)   Height: 5' 4" (1.626 m)       MOST RECENT LABS IN OUR ELECTRONIC MEDICAL RECORD:     Results for orders placed or performed in visit on 10/23/17   TSH   Result Value Ref Range    TSH 2.420 0.400 - 4.000 uIU/mL         "

## 2018-08-29 NOTE — TELEPHONE ENCOUNTER
----- Message from Gerhard Garcia sent at 8/29/2018 12:28 PM CDT -----  Contact: Patient  Type:  Same Day Appointment Request    Caller is requesting a same day appointment.  Caller declined first available appointment listed below.      Name of Caller:  Vandana  When is the first available appointment?  9/6  Symptoms:  Sinus infection  Best Call Back Number:  [phone]  Additional Information:   Says if she cannot get an appointment today, she would be willing to come in tomorrow

## 2018-08-29 NOTE — TELEPHONE ENCOUNTER
Spoke with pt and advised, per Dr. Chou, that she can come in this afternoon if she can make it by 4:15.  Pt states she may be able to make it by then and if she cannot she will call and let us know and come tomorrow afternoon

## 2018-11-12 ENCOUNTER — OFFICE VISIT (OUTPATIENT)
Dept: FAMILY MEDICINE | Facility: CLINIC | Age: 38
End: 2018-11-12
Payer: COMMERCIAL

## 2018-11-12 VITALS
DIASTOLIC BLOOD PRESSURE: 54 MMHG | SYSTOLIC BLOOD PRESSURE: 98 MMHG | HEIGHT: 64 IN | RESPIRATION RATE: 16 BRPM | BODY MASS INDEX: 22.58 KG/M2 | TEMPERATURE: 98 F | OXYGEN SATURATION: 99 % | HEART RATE: 67 BPM | WEIGHT: 132.25 LBS

## 2018-11-12 DIAGNOSIS — J02.9 SORETHROAT: Primary | ICD-10-CM

## 2018-11-12 LAB
CTP QC/QA: YES
S PYO RRNA THROAT QL PROBE: NEGATIVE

## 2018-11-12 PROCEDURE — 87880 STREP A ASSAY W/OPTIC: CPT | Mod: QW,S$GLB,, | Performed by: NURSE PRACTITIONER

## 2018-11-12 PROCEDURE — 99999 PR PBB SHADOW E&M-EST. PATIENT-LVL V: CPT | Mod: PBBFAC,,, | Performed by: NURSE PRACTITIONER

## 2018-11-12 PROCEDURE — 87081 CULTURE SCREEN ONLY: CPT

## 2018-11-12 PROCEDURE — 3008F BODY MASS INDEX DOCD: CPT | Mod: CPTII,S$GLB,, | Performed by: NURSE PRACTITIONER

## 2018-11-12 PROCEDURE — 99214 OFFICE O/P EST MOD 30 MIN: CPT | Mod: S$GLB,,, | Performed by: NURSE PRACTITIONER

## 2018-11-12 PROCEDURE — 87147 CULTURE TYPE IMMUNOLOGIC: CPT

## 2018-11-12 NOTE — PROGRESS NOTES
This dictation has been generated using Modal Fluency Dictation some phonetic errors may occur. Please contact author for clarification if needed.     Problem List Items Addressed This Visit     None      Visit Diagnoses     Sorethroat    -  Primary    Relevant Orders    POCT Rapid Strep A (Completed)    Strep A culture, throat        Orders Placed This Encounter    Strep A culture, throat    POCT Rapid Strep A     Sore throat and fever. poct strep negative. Culture pending  Patient Instructions   Allegra daily  Flonase daily  Stop dayquil.     Follow-up if symptoms worsen or fail to improve.    ________________________________________________________________  ________________________________________________________________      Chief Complaint   Patient presents with    Sore Throat     since around yesterday     Fever     yesterday afternoon 107 degrees      History of present illness  This 38 y.o. presents today for complaint of sore throat. Symptoms started yesterday. The patient is new to me but follows with one of my partners. She notes fever 100.7 and sore throat. She has had some runny nose symptoms prior to onset. She tried dayquil without improvement.   Ros  Fever, chills, and sweats noted  No rash  No cough  Nausea without vomiting.   No gland swelling    Past Medical History:   Diagnosis Date    ALLERGIC RHINITIS        Past Surgical History:   Procedure Laterality Date    WISDOM TOOTH EXTRACTION         Family History   Problem Relation Age of Onset    Heart failure Paternal Grandmother     Stroke Paternal Grandmother     Uterine cancer Maternal Grandmother     Cancer Maternal Grandmother         uterine    Heart disease Paternal Grandfather     Cancer Maternal Grandfather         lung    Glaucoma Maternal Grandfather     No Known Problems Father     No Known Problems Mother     No Known Problems Sister     No Known Problems Brother     No Known Problems Maternal Aunt     No Known  Problems Maternal Uncle     No Known Problems Paternal Aunt     No Known Problems Paternal Uncle     Breast cancer Neg Hx     Ovarian cancer Neg Hx     Amblyopia Neg Hx     Blindness Neg Hx     Cataracts Neg Hx     Diabetes Neg Hx     Hypertension Neg Hx     Macular degeneration Neg Hx     Retinal detachment Neg Hx     Strabismus Neg Hx     Thyroid disease Neg Hx        Social History     Socioeconomic History    Marital status:      Spouse name: None    Number of children: None    Years of education: None    Highest education level: None   Social Needs    Financial resource strain: None    Food insecurity - worry: None    Food insecurity - inability: None    Transportation needs - medical: None    Transportation needs - non-medical: None   Occupational History    Occupation: oceanographic research     Employer: [employer]   Tobacco Use    Smoking status: Never Smoker    Smokeless tobacco: Never Used   Substance and Sexual Activity    Alcohol use: No    Drug use: No    Sexual activity: Yes     Partners: Male     Birth control/protection: None, Condom   Other Topics Concern    None   Social History Narrative    None       Current Outpatient Medications   Medication Sig Dispense Refill    escitalopram oxalate (LEXAPRO) 20 MG tablet Take 1 tablet (20 mg total) by mouth once daily. 30 tablet 4    escitalopram oxalate (LEXAPRO) 20 MG tablet Take 1 tablet (20 mg total) by mouth once daily. 30 tablet 2    fluticasone (FLONASE) 50 mcg/actuation nasal spray 1 spray (50 mcg total) by Each Nare route once daily. 16 g 0    guaifenesin (MUCINEX) 1,200 mg Ta12 Take 1 tablet by mouth 2 (two) times daily. 20 tablet 0    MV-MN/IRON FUM/FA/OMEGA3,6,9#3 (WOMEN'S MULTI ORAL) Take by mouth.      PSEUDOEPH/DM/GUAIFEN/ACETAMIN (VICKS DAYQUIL LIQUICAPS ORAL) Take by mouth.      levocetirizine (XYZAL) 5 MG tablet Take 1 tablet (5 mg total) by mouth every evening. 30 tablet 11      No current facility-administered medications for this visit.        Review of patient's allergies indicates:  No Known Allergies    Physical examination  Vitals Reviewed  Gen. Well-dressed well-nourished   Skin warm dry and intact.  No rashes noted.  HEENT.  TM intact bilateral with normal light reflex.  No mastoid tenderness during percussion.  Nares patent bilateral.  Pharynx with erythematous changes.  No maxillary or frontal sinus tenderness when percussed.    Neck is supple without adenopathy  Chest.  Respirations are even unlabored.  Lungs are clear to auscultation.  Cardiac regular rate and rhythm.  No chest wall adenopathy noted.  Neuro. Awake alert oriented x4.  Normal judgment and cognition noted.  Extremities no clubbing cyanosis or edema noted.     Call or return to clinic prn if these symptoms worsen or fail to improve as anticipated.

## 2018-11-13 ENCOUNTER — TELEPHONE (OUTPATIENT)
Dept: FAMILY MEDICINE | Facility: CLINIC | Age: 38
End: 2018-11-13

## 2018-11-13 ENCOUNTER — PATIENT MESSAGE (OUTPATIENT)
Dept: FAMILY MEDICINE | Facility: CLINIC | Age: 38
End: 2018-11-13

## 2018-11-13 LAB — BACTERIA THROAT CULT: NORMAL

## 2018-11-13 RX ORDER — AMOXICILLIN 500 MG/1
500 CAPSULE ORAL 3 TIMES DAILY
Qty: 21 CAPSULE | Refills: 0 | Status: SHIPPED | OUTPATIENT
Start: 2018-11-13 | End: 2018-11-23

## 2018-11-13 NOTE — TELEPHONE ENCOUNTER
----- Message from Lynda Taylor sent at 11/13/2018  9:54 AM CST -----  Type:  Pharmacy Calling to Clarify an RX    Name of Caller:  Dewy  Pharmacy Name:  [facility]  Prescription Name:  amoxicillin (AMOXIL) 500 MG capsule   What do they need to clarify?:  Quantity and directions. It did not match up.  Best Call Back Number:  [phone]

## 2018-11-30 ENCOUNTER — TELEPHONE (OUTPATIENT)
Dept: FAMILY MEDICINE | Facility: CLINIC | Age: 38
End: 2018-11-30

## 2018-11-30 ENCOUNTER — OFFICE VISIT (OUTPATIENT)
Dept: URGENT CARE | Facility: CLINIC | Age: 38
End: 2018-11-30
Payer: COMMERCIAL

## 2018-11-30 VITALS
SYSTOLIC BLOOD PRESSURE: 100 MMHG | BODY MASS INDEX: 22.53 KG/M2 | HEIGHT: 64 IN | DIASTOLIC BLOOD PRESSURE: 67 MMHG | OXYGEN SATURATION: 99 % | HEART RATE: 86 BPM | TEMPERATURE: 98 F | RESPIRATION RATE: 16 BRPM | WEIGHT: 132 LBS

## 2018-11-30 DIAGNOSIS — J02.9 SORE THROAT: Primary | ICD-10-CM

## 2018-11-30 LAB
CTP QC/QA: YES
S PYO RRNA THROAT QL PROBE: NEGATIVE

## 2018-11-30 PROCEDURE — 99214 OFFICE O/P EST MOD 30 MIN: CPT | Mod: S$GLB,,, | Performed by: FAMILY MEDICINE

## 2018-11-30 PROCEDURE — 87880 STREP A ASSAY W/OPTIC: CPT | Mod: QW,S$GLB,, | Performed by: FAMILY MEDICINE

## 2018-11-30 PROCEDURE — 3008F BODY MASS INDEX DOCD: CPT | Mod: CPTII,S$GLB,, | Performed by: FAMILY MEDICINE

## 2018-11-30 RX ORDER — LIDOCAINE HYDROCHLORIDE 20 MG/ML
SOLUTION OROPHARYNGEAL
Qty: 15 ML | Refills: 0 | Status: SHIPPED | OUTPATIENT
Start: 2018-11-30 | End: 2019-04-18

## 2018-11-30 RX ORDER — CEFDINIR 300 MG/1
300 CAPSULE ORAL 2 TIMES DAILY
Qty: 20 CAPSULE | Refills: 0 | Status: SHIPPED | OUTPATIENT
Start: 2018-11-30 | End: 2018-12-10

## 2018-11-30 NOTE — TELEPHONE ENCOUNTER
----- Message from Abraham Rodriguez sent at 11/30/2018  7:12 AM CST -----  Contact: Self  Patient needs to be seen today no appts available  Please call [phone] (home)

## 2018-11-30 NOTE — PROGRESS NOTES
"Subjective:       Patient ID: Vandana Cobos is a 38 y.o. female.    Vitals:  height is 5' 4" (1.626 m) and weight is 59.9 kg (132 lb). Her tympanic temperature is 98 °F (36.7 °C). Her blood pressure is 100/67 and her pulse is 86. Her respiration is 16 and oxygen saturation is 99%.     Chief Complaint: Sore Throat    Patient has been sick since Sunday with a sore throat and a fever. Patient has taken ibprofen. Patient was diagnosed with strep a couple of weeks ago.      Sore Throat    This is a new problem. The current episode started in the past 7 days. The pain is at a severity of 0/10. The patient is experiencing no pain. Associated symptoms include headaches, swollen glands and trouble swallowing. Pertinent negatives include no congestion, coughing, ear pain, shortness of breath, stridor or vomiting. She has tried NSAIDs for the symptoms. The treatment provided mild relief.       Constitution: Negative for chills, sweating, fatigue and fever.   HENT: Positive for sore throat and trouble swallowing. Negative for ear pain, congestion, sinus pain, sinus pressure and voice change.    Neck: Negative for painful lymph nodes.   Eyes: Negative for eye redness.   Respiratory: Negative for chest tightness, cough, sputum production, bloody sputum, COPD, shortness of breath, stridor, wheezing and asthma.    Gastrointestinal: Negative for nausea and vomiting.   Musculoskeletal: Negative for muscle ache.   Skin: Negative for rash.   Allergic/Immunologic: Negative for seasonal allergies and asthma.   Neurological: Positive for headaches.   Hematologic/Lymphatic: Negative for swollen lymph nodes.       Objective:      Physical Exam   Constitutional: She is oriented to person, place, and time. She appears well-developed and well-nourished. She is cooperative.  Non-toxic appearance. She does not appear ill. No distress.   HENT:   Head: Normocephalic and atraumatic.   Right Ear: Hearing, tympanic membrane, external ear and ear " canal normal.   Left Ear: Hearing, tympanic membrane, external ear and ear canal normal.   Nose: Nose normal. No mucosal edema, rhinorrhea or nasal deformity. No epistaxis. Right sinus exhibits no maxillary sinus tenderness and no frontal sinus tenderness. Left sinus exhibits no maxillary sinus tenderness and no frontal sinus tenderness.   Mouth/Throat: Uvula is midline and mucous membranes are normal. No trismus in the jaw. Normal dentition. No uvula swelling. Posterior oropharyngeal erythema present.   Eyes: Conjunctivae and lids are normal. No scleral icterus.   Sclera clear bilat   Neck: Trachea normal, full passive range of motion without pain and phonation normal. Neck supple.   Cardiovascular: Normal rate, regular rhythm, normal heart sounds, intact distal pulses and normal pulses.   Pulmonary/Chest: Effort normal and breath sounds normal. No respiratory distress.   Abdominal: Normal appearance. She exhibits no distension. There is no tenderness.   Musculoskeletal: Normal range of motion. She exhibits no edema or deformity.   Lymphadenopathy:     She has cervical adenopathy.        Right cervical: Superficial cervical adenopathy present.        Left cervical: Superficial cervical adenopathy present.   Neurological: She is alert and oriented to person, place, and time. She exhibits normal muscle tone. Coordination normal.   Skin: Skin is warm, dry and intact. She is not diaphoretic. No pallor.   Psychiatric: She has a normal mood and affect. Her speech is normal and behavior is normal. Judgment and thought content normal. Cognition and memory are normal.   Nursing note and vitals reviewed.      Assessment:       1. Sore throat        Plan:         Sore throat  -     Strep A culture, throat  -     POCT rapid strep A    Other orders  -     cefdinir (OMNICEF) 300 MG capsule; Take 1 capsule (300 mg total) by mouth 2 (two) times daily. for 10 days  Dispense: 20 capsule; Refill: 0  -     lidocaine HCl 2% (LIDOCAINE  VISCOUS) 2 % Soln; Apply 3mL oral mucous membranes as needed  Dispense: 15 mL; Refill: 0    pt treated for strep 2 weeks ago. Feels like never really healed

## 2018-11-30 NOTE — TELEPHONE ENCOUNTER
Spoke with patient and she states that she has taken the abx and states that her throat is worse and she can barely swallow. Advised that she go to UC to be evaluated. No openings in the clinic. Patient verbalized understanding.

## 2018-12-03 ENCOUNTER — TELEPHONE (OUTPATIENT)
Dept: URGENT CARE | Facility: CLINIC | Age: 38
End: 2018-12-03

## 2018-12-03 LAB — S PYO THROAT QL CULT: POSITIVE

## 2018-12-03 NOTE — TELEPHONE ENCOUNTER
Pt is starting to feel better.  Explained that she was on the correct medicine for her positive culture.   (0) understands/communicates without difficulty

## 2018-12-18 RX ORDER — ESCITALOPRAM OXALATE 20 MG/1
TABLET ORAL
Qty: 30 TABLET | Refills: 4 | Status: SHIPPED | OUTPATIENT
Start: 2018-12-18 | End: 2018-12-20 | Stop reason: SDUPTHER

## 2018-12-20 ENCOUNTER — OFFICE VISIT (OUTPATIENT)
Dept: FAMILY MEDICINE | Facility: CLINIC | Age: 38
End: 2018-12-20
Payer: COMMERCIAL

## 2018-12-20 VITALS
SYSTOLIC BLOOD PRESSURE: 114 MMHG | WEIGHT: 133.25 LBS | DIASTOLIC BLOOD PRESSURE: 66 MMHG | BODY MASS INDEX: 22.75 KG/M2 | HEIGHT: 64 IN | TEMPERATURE: 98 F | HEART RATE: 76 BPM

## 2018-12-20 DIAGNOSIS — J02.9 SORE THROAT: Primary | ICD-10-CM

## 2018-12-20 DIAGNOSIS — J06.9 VIRAL URI: ICD-10-CM

## 2018-12-20 PROCEDURE — 99999 PR PBB SHADOW E&M-EST. PATIENT-LVL III: CPT | Mod: PBBFAC,,, | Performed by: INTERNAL MEDICINE

## 2018-12-20 PROCEDURE — 99214 OFFICE O/P EST MOD 30 MIN: CPT | Mod: S$GLB,,, | Performed by: INTERNAL MEDICINE

## 2018-12-20 PROCEDURE — 87147 CULTURE TYPE IMMUNOLOGIC: CPT

## 2018-12-20 PROCEDURE — 3008F BODY MASS INDEX DOCD: CPT | Mod: CPTII,S$GLB,, | Performed by: INTERNAL MEDICINE

## 2018-12-20 PROCEDURE — 87081 CULTURE SCREEN ONLY: CPT

## 2018-12-20 RX ORDER — FEXOFENADINE HCL 60 MG
60 TABLET ORAL DAILY
COMMUNITY

## 2018-12-20 NOTE — PROGRESS NOTES
Assessment and Plan:    1. Sore throat  - POCT Rapid Strep A  - Strep A culture, throat    2. Viral URI  Patient has been treated twice for culture positive strep throat in the past month. Now presenting with sore throat for a third time. However, at this point the sore throat is less severe and on its own does not look or sound like strep throat. No F/C, no LAD, has cough. Will send culture and defer treatment unless this is culture positive. We also discussed that she may be a carrier, and this may test positive despite the actual current symptoms being from a viral URI. We discussed symptomatic treatment for URI. She has already been scheduled to see ENT in a couple of weeks, she will keep this appointment.       ______________________________________________________________________  Subjective:    Chief Complaint:  Sore throat, headache, and nasal congestion    HPI:  Vandana is a 38 y.o. year old woman here for evaluation of sore throat, headache, and nasal congestion.     She reports that she has had strep twice in the last month and the sore throat returned yesterday.     Seen by NP on 11/12 and throat culture was positive for strep, she was treated with a course of amoxicillin. Seen again in urgent care on 11/30 and again throat culture was positive. She was treated with cefdinir at that time.     She reports that the symptoms came back yesterday but not nearly as severe as before. Sore throat is less prominent this time, but having a lot of nasal congestion. Some cough but it has been minor. No fever or chills. No LAD.    Medications:  Current Outpatient Medications on File Prior to Visit   Medication Sig Dispense Refill    escitalopram oxalate (LEXAPRO) 20 MG tablet Take 1 tablet (20 mg total) by mouth once daily. 30 tablet 2    fexofenadine (ALLEGRA) 60 MG tablet Take 60 mg by mouth once daily.      fluticasone (FLONASE) 50 mcg/actuation nasal spray 1 spray (50 mcg total) by Each Nare route once daily.  "16 g 0    guaifenesin (MUCINEX) 1,200 mg Ta12 Take 1 tablet by mouth 2 (two) times daily. 20 tablet 0    MV-MN/IRON FUM/FA/OMEGA3,6,9#3 (WOMEN'S MULTI ORAL) Take by mouth.      PSEUDOEPH/DM/GUAIFEN/ACETAMIN (VICKS DAYQUIL LIQUICAPS ORAL) Take by mouth.      lidocaine HCl 2% (LIDOCAINE VISCOUS) 2 % Soln Apply 3mL oral mucous membranes as needed 15 mL 0    [DISCONTINUED] escitalopram oxalate (LEXAPRO) 20 MG tablet TAKE 1 TABLET BY MOUTH ONCE DAILY 30 tablet 4    [DISCONTINUED] levocetirizine (XYZAL) 5 MG tablet Take 1 tablet (5 mg total) by mouth every evening. 30 tablet 11     No current facility-administered medications on file prior to visit.        Review of Systems:  Review of Systems   Constitutional: Negative for chills and fever.   HENT: Positive for congestion, postnasal drip, rhinorrhea, sinus pressure and sore throat. Negative for sinus pain, trouble swallowing and voice change.    Respiratory: Negative for shortness of breath and wheezing.    Gastrointestinal: Negative for nausea and vomiting.       Past Medical History:  Past Medical History:   Diagnosis Date    ALLERGIC RHINITIS        Objective:    Vitals:  Vitals:    12/20/18 1113   BP: 114/66   Pulse: 76   Temp: 98.3 °F (36.8 °C)   Weight: 60.5 kg (133 lb 4.3 oz)   Height: 5' 4" (1.626 m)   PainSc:   5   PainLoc: Throat       Physical Exam   Constitutional: She is oriented to person, place, and time. She appears well-developed and well-nourished. No distress.   HENT:   Mouth/Throat: Posterior oropharyngeal erythema (mild) present. No oropharyngeal exudate or posterior oropharyngeal edema. Tonsils are 1+ on the right. Tonsils are 1+ on the left. No tonsillar exudate.   Eyes: Conjunctivae are normal. Right eye exhibits no discharge. Left eye exhibits no discharge.   Cardiovascular: Normal rate and regular rhythm.   Pulmonary/Chest: Effort normal. No respiratory distress.   Neurological: She is alert and oriented to person, place, and time. "   Skin: Skin is warm and dry.   Psychiatric: She has a normal mood and affect. Her behavior is normal. Judgment and thought content normal.   Vitals reviewed.      Data:    POCT strep negative    Destiny Garrett MD  Internal Medicine

## 2018-12-21 ENCOUNTER — TELEPHONE (OUTPATIENT)
Dept: FAMILY MEDICINE | Facility: CLINIC | Age: 38
End: 2018-12-21

## 2018-12-21 NOTE — TELEPHONE ENCOUNTER
----- Message from Janet Redman sent at 12/21/2018 11:54 AM CST -----  Type:  Test Results    Who Called:  Self   Name of Test (Lab/Mammo/Etc):  Lab Date of Test: 12/20/2018 Ordering Provider:  Dr Garrett   Where the test was performed:  [facility]   Best Call Back Number: [phone]  Additional Information:  Patient asking for a new rx for poss sinus infection. [facility] [address]

## 2018-12-21 NOTE — TELEPHONE ENCOUNTER
Please let her know that when I had seen her less than 24 hours ago there was no sign of a sinus infection, and we typically would not treat a sinus infection with an antibiotic unless it has been present for 7-10 days and is worsening. If over the next week her symptoms are getting significantly worse, please let us know. Ideally, would not start an antibiotic for sinus infection before a full week of sinus specific symptoms.

## 2018-12-21 NOTE — TELEPHONE ENCOUNTER
Type: Flu Symptoms/URI/Cold/Sinus    How long? :  11-12-18  Fever? No  Congestion?Yes   Cough? No  Mucous? Yes   If yes, what color? clear  Shortness of Breath / Wheezing? n/a   Any medications taken over the counter? No  If so, what medications? N/A  Has anyone around been sick? her kids  Have you seen recently for this issue? Yes  Are you allergic to anything? No  Preferred pharmacy?   [facility] [address] [place] [address] [address] [facility]  [address] [facility] [address]  Phone: [phone] Fax: [phone]    Additional information:  Seen on 12-20-18    Pt is seeing an ENT on the 10th.

## 2018-12-22 LAB — BACTERIA THROAT CULT: NORMAL

## 2018-12-26 ENCOUNTER — TELEPHONE (OUTPATIENT)
Dept: FAMILY MEDICINE | Facility: CLINIC | Age: 38
End: 2018-12-26

## 2018-12-26 DIAGNOSIS — J02.0 STREP THROAT: Primary | ICD-10-CM

## 2018-12-26 RX ORDER — AMOXICILLIN 875 MG/1
875 TABLET, FILM COATED ORAL 2 TIMES DAILY
Qty: 20 TABLET | Refills: 0 | Status: SHIPPED | OUTPATIENT
Start: 2018-12-26 | End: 2018-12-26

## 2018-12-26 RX ORDER — CEFDINIR 300 MG/1
300 CAPSULE ORAL 2 TIMES DAILY
Qty: 20 CAPSULE | Refills: 0 | Status: SHIPPED | OUTPATIENT
Start: 2018-12-26 | End: 2019-01-05

## 2018-12-26 NOTE — TELEPHONE ENCOUNTER
Please review strep culture from 12/20, pt states her throat is more sore this morning and asking for treatment.

## 2018-12-26 NOTE — TELEPHONE ENCOUNTER
Amoxicillin has better coverage against Group A strep than Omnicef. So if her condition previously did not clear with Amoxil it wasn't strep. And it very well could be that this positive culture was because she's colonized, and the strep culture is clouding the picture here. If she has strep throat Amoxil is the best choice. If it doesn't work then there something else going on and she needs to go to an ENT doctor. I don't intend to argue with her and if she has her mind set on one thing then she probably will not get better in her mind until she takes the omnicef, but in my medical judgment I would stick to the amoxicillin unless she just refuses.

## 2018-12-26 NOTE — TELEPHONE ENCOUNTER
----- Message from Janet Redman sent at 12/26/2018 11:18 AM CST -----  Type: Needs Medical Advice    Who Called:  Self   Symptoms (please be specific):  NA   How long has patient had these symptoms:  NA   Pharmacy name and phone #: [facility] [address]   Best Call Back Number: [phone]  Additional Information: Patient is leaving to board a airplane today,headed to [place]. Patient want to know if rx will be called into a local pharmacy prior to leaving today. If not the patient will call back with a pharmacy phone number for the doctor.

## 2018-12-26 NOTE — TELEPHONE ENCOUNTER
----- Message from Xiomara Price sent at 12/26/2018 12:35 PM CST -----  Contact: Self  Type:  Patient Returning Call    Who Called:  Patient   Who Left Message for Patient:  Dwight   Does the patient know what this is regarding?:    Best Call Back Number:  [phone] (home)     Additional Information:

## 2018-12-26 NOTE — TELEPHONE ENCOUNTER
Pt was wondering if she can use Augmentin, advised that it was the same thing as amoxicillin. Pt expressed understanding.

## 2018-12-26 NOTE — TELEPHONE ENCOUNTER
----- Message from Maria Guadalupe Whelan sent at 12/26/2018  4:50 PM CST -----  Contact: self  Patient returning call to [place]       Please call to advice [phone] (home)

## 2018-12-26 NOTE — TELEPHONE ENCOUNTER
Pt states she took amoxil the first time she had strep a month ago, was given omnicef the second time and is concerned that the amoxil will not clear it this time. She is at the airport on her way to [place] and needs Rx sent to pharmacy in [place]. Pharmacy updated.

## 2018-12-26 NOTE — TELEPHONE ENCOUNTER
----- Message from Laurita Zheng sent at 12/26/2018  8:12 AM CST -----  Contact: pt   Pt states that last week she did a strep culture and she sees on the portal looks like results didn't show negative but didn't say positive,appears to her possitive,so she is calling to see what the Dr wants to do,give her more ,antibiotic,throat is more sore this morning. Please advise at..[phone] (home)

## 2018-12-27 ENCOUNTER — PATIENT MESSAGE (OUTPATIENT)
Dept: FAMILY MEDICINE | Facility: CLINIC | Age: 38
End: 2018-12-27

## 2018-12-27 RX ORDER — ESCITALOPRAM OXALATE 20 MG/1
20 TABLET ORAL DAILY
Qty: 7 TABLET | Refills: 0 | Status: SHIPPED | OUTPATIENT
Start: 2018-12-27 | End: 2019-12-27

## 2019-01-10 ENCOUNTER — OFFICE VISIT (OUTPATIENT)
Dept: OTOLARYNGOLOGY | Facility: CLINIC | Age: 39
End: 2019-01-10
Payer: COMMERCIAL

## 2019-01-10 VITALS — HEIGHT: 64 IN | WEIGHT: 132.5 LBS | BODY MASS INDEX: 22.62 KG/M2

## 2019-01-10 DIAGNOSIS — J32.4 CHRONIC PANSINUSITIS: Primary | ICD-10-CM

## 2019-01-10 DIAGNOSIS — J33.9 NASAL POLYPOSIS: ICD-10-CM

## 2019-01-10 PROCEDURE — 3008F BODY MASS INDEX DOCD: CPT | Mod: CPTII,S$GLB,, | Performed by: OTOLARYNGOLOGY

## 2019-01-10 PROCEDURE — 99999 PR PBB SHADOW E&M-EST. PATIENT-LVL III: ICD-10-PCS | Mod: PBBFAC,,, | Performed by: OTOLARYNGOLOGY

## 2019-01-10 PROCEDURE — 99204 OFFICE O/P NEW MOD 45 MIN: CPT | Mod: S$GLB,,, | Performed by: OTOLARYNGOLOGY

## 2019-01-10 PROCEDURE — 3008F PR BODY MASS INDEX (BMI) DOCUMENTED: ICD-10-PCS | Mod: CPTII,S$GLB,, | Performed by: OTOLARYNGOLOGY

## 2019-01-10 PROCEDURE — 99999 PR PBB SHADOW E&M-EST. PATIENT-LVL III: CPT | Mod: PBBFAC,,, | Performed by: OTOLARYNGOLOGY

## 2019-01-10 PROCEDURE — 99204 PR OFFICE/OUTPT VISIT, NEW, LEVL IV, 45-59 MIN: ICD-10-PCS | Mod: S$GLB,,, | Performed by: OTOLARYNGOLOGY

## 2019-01-10 NOTE — PATIENT INSTRUCTIONS
Nasal Steroid Spray    You have been prescribed or instructed to take a nasal steroid spray. Examples of this medication include Flonase (fluticasone), Nasacort (triamcinolone), and Rhinocort (budeosnide). Some symptoms will experience relief within 1-2 days; however, it may take other side effects 2-3 weeks to begin to see improvement. This medication needs to be taken consistently to see results.    Use as directed, spraying 1-2 times in each nostril per day.     Helpful hints for maximizing medication into the nose  - Use the opposite hand to spray the nostril (example: right hand for left nostril). This will help avoid spraying the medication onto the septum (the area that divides the left and right nasal cavity.)  - Tilt the bottle so that it is facing at a slight angle up or straight back, but avoid pointing the bottle straight up while spraying.   - Sniff in while you are spraying.    Side effects:  Overall, this is a well tolerated medication with low side effects. The benefit of nasal steroids as opposed to oral steroids is that the nasal steroid works primarily in the nose.  Common side effects: headache, nasal dryness, minor nose bleeds,   Rare side effects: septal perforation, elevated eye pressures, dry eyes, change in smell, allergic reaction.  Notify your doctor if you have any concerns or experience these symptoms.  Nasal Irrigations  This will help remove the allergens, debris, and mucous from your nose to help you breathe. It will also clear it in preparation for other nasal medications.    To perform, purchase an over the counter sinus irrigation kit such as the [employer] Sinus Rinse Kit. Use as directed on the box. You should use distilled water or water that was previously boiled and left to cool. If you wish, you may make your own solution. However, salt packets are available in the nasal section in your  drug store.     A rough estimate for making salt solution is:  8oz water  2 teaspoons salt  (pickling, argentina or Kosher salt)  1 teaspoon baking soda    After each use, rinse the bottle with small amount of rubbing alcohol and clean with soap.  Replace the irrigation bottle if it becomes visibly soiled or every few weeks.

## 2019-01-10 NOTE — PROGRESS NOTES
Subjective:       Patient ID: Vandana Cobos is a 38 y.o. female.    Chief Complaint: Nasal Congestion and recurrent strep    Vandana is here for sinus/nasal complaints. Symptoms have been present for years and generally her baseline is always with some degree of nasal symptoms. She has periods of infections (~ 3 over the past year) but endorses chronic symptoms that are more bothersome.  Baseline is as below.   Post-nasal drainage: yes  Pressure: yes, intermittent   Congestion: yes, constant  Decreased sense of smell: yes, more often than not  Therapies tried: 3 rounds of antibiotics, including Omnicef x 2 (for strep throat) which was treated concurrently with her baseline sinus symptoms and did not provide reliable improvement. Oral steroids provide a lot of relief. Saline spray, Flonase intermittently. Oral AH does not help.   Seasonal variation: worse in winter.     Has had 3 episodes of strep tonsillitis since November (2-3 months) with 3 rounds of antibiotics. In between episodes, her sore throat improved. Since the most recent episode, her throat has been doing better. Completed last round of antibiotics < 1 week ago. Prior to this she did not have recurrent tonsillitis.     Allergy testing: yes, as a child - unsure if current allergies but does endorse sneezing, ocular symptoms, and as above  History of asthma: no  Anticoagulation: no   Pertinent surgical history: no nasal sinus surgery   Pertinent medical issues: Glaucoma suspect.     SNOT-22=37    Social History     Tobacco Use   Smoking Status Never Smoker   Smokeless Tobacco Never Used     Social History     Substance and Sexual Activity   Alcohol Use No        Review of Systems   Constitutional: Negative for activity change and appetite change.   Eyes: Negative for discharge.   Respiratory: Negative for difficulty breathing and wheezing   Cardiovascular: Negative for chest pain.   Gastrointestinal: Negative for abdominal distention and abdominal pain.    Endocrine: Negative for cold intolerance and heat intolerance.   Genitourinary: Negative for dysuria.   Musculoskeletal: Negative for gait problem and joint swelling.   Skin: Negative for color change and pallor.   Neurological: Negative for syncope and weakness.   Psychiatric/Behavioral: Negative for agitation and confusion.     Objective:        Constitutional:   She is oriented to person, place, and time. She appears well-developed and well-nourished. She appears alert. She is active. Normal speech.      Head:  Normocephalic and atraumatic. Head is without TMJ tenderness. No scars. Salivary glands normal.  Facial strength is normal.      Ears:    Right Ear: No drainage or swelling. No middle ear effusion.   Left Ear: No drainage or swelling.  No middle ear effusion.     Nose:  Mucosal edema (moderate MM), septal deviation (mild right high) and polyps (polypoid change of MM extending over IT) present. No rhinorrhea or sinus tenderness. Turbinate hypertrophy (mild).    Bifid nasal tip  Mild narrow nasal vault    Mouth/Throat  Oropharynx clear and moist without lesions or asymmetry, normal uvula midline and mirror exam normal. Normal dentition. No uvula swelling, lacerations or trismus. No oropharyngeal exudate. Tonsils present, +1.  Tonsillar erythema, tonsillar exudate.      Neck:  Full range of motion with neck supple and no adenopathy. Thyroid tenderness is present. No tracheal deviation, no edema, no erythema, normal range of motion, no stridor, no crepitus and no neck rigidity present. No thyroid mass present.     Cardiovascular:   Intact distal pulses and normal pulses.      Pulmonary/Chest:   Effort normal and breath sounds normal. No stridor.     Psychiatric:   Her speech is normal and behavior is normal. Her mood appears not anxious. Her affect is not labile.     Neurological:   She is alert and oriented to person, place, and time. No sensory deficit.     Skin:   No abrasions, lacerations, lesions, or  rashes. No abrasion and no bruising noted.         Tests / Results:  None    Assessment:       1. Chronic pansinusitis    2. Nasal polyposis          Plan:       Discussed regular use of saline irrigations and nasal steroid (can switch to steroid without taste if preferred.)   Given chronicity and exam, recommend CT Sinus to eval degree of paranasal sinus dz. Do suspect polyposis based on rhinoscopy.  Possible underlying allergic component, can evaluate pending results of CT  May consider additional topical medication based on results but will discuss when I call with results     Pharynx normal appearing today, has improved from pharyngitis. She may be a carrier but I discussed would be managed the same way as non-carrier and since symptoms improving, we will monitor to see if becoming recurrent or chronic.

## 2019-01-15 ENCOUNTER — HOSPITAL ENCOUNTER (OUTPATIENT)
Dept: RADIOLOGY | Facility: HOSPITAL | Age: 39
Discharge: HOME | End: 2019-01-15
Attending: OTOLARYNGOLOGY
Payer: COMMERCIAL

## 2019-01-15 DIAGNOSIS — J32.4 CHRONIC PANSINUSITIS: ICD-10-CM

## 2019-01-15 DIAGNOSIS — J33.9 NASAL POLYPOSIS: ICD-10-CM

## 2019-01-15 PROCEDURE — 70486 CT SINUSES WITHOUT CONTRAST: ICD-10-PCS | Mod: 26,,, | Performed by: RADIOLOGY

## 2019-01-15 PROCEDURE — 70486 CT MAXILLOFACIAL W/O DYE: CPT | Mod: TC,PO

## 2019-01-15 PROCEDURE — 70486 CT MAXILLOFACIAL W/O DYE: CPT | Mod: 26,,, | Performed by: RADIOLOGY

## 2019-01-16 ENCOUNTER — TELEPHONE (OUTPATIENT)
Dept: OTOLARYNGOLOGY | Facility: CLINIC | Age: 39
End: 2019-01-16

## 2019-01-16 RX ORDER — PREDNISONE 20 MG/1
40 TABLET ORAL DAILY
Qty: 14 TABLET | Refills: 0 | Status: SHIPPED | OUTPATIENT
Start: 2019-01-16 | End: 2019-01-23

## 2019-01-16 NOTE — PROGRESS NOTES
Discussed results with the patient over the phone.  Bilateral mild maxillary and ethmoid disease with intranasal polyposis.  Discussed the general treatment of chronic sinusitis with polyposis. She does not get oral steroids that often, and the last round was 5 months ago and was a Medrol Dosepak.  She does have glaucoma suspect but this is marginal and no other major medical conditions.  We did discuss higher dose steroids to see if we can get her a little longer lasting relief and measure how long.  If she has recurrence quickly was requiring steroids more frequently than we desire, we would like to consider functional endoscopic sinus surgery.     I also discussed topical irrigations with steroid.  I think this is something we can look into, but for now she will continue the nasal steroid.  She will follow up with me in 6 weeks or call me sooner if her issues worsen before then.

## 2019-01-16 NOTE — TELEPHONE ENCOUNTER
----- Message from RT Charo sent at 1/16/2019  9:49 AM CST -----  Contact: pt    pt , returned missed call, called pod, thanks.

## 2019-01-17 NOTE — TELEPHONE ENCOUNTER
----- Message from Timmy Vallejo MD sent at 1/16/2019  5:33 PM CST -----  Please schedule 6 week fu with me

## 2019-02-26 ENCOUNTER — OFFICE VISIT (OUTPATIENT)
Dept: OTOLARYNGOLOGY | Facility: CLINIC | Age: 39
End: 2019-02-26
Payer: COMMERCIAL

## 2019-02-26 VITALS — WEIGHT: 134.06 LBS | BODY MASS INDEX: 22.89 KG/M2 | HEIGHT: 64 IN

## 2019-02-26 DIAGNOSIS — J33.9 NASAL POLYPOSIS: ICD-10-CM

## 2019-02-26 DIAGNOSIS — J34.2 NASAL SEPTAL DEVIATION: ICD-10-CM

## 2019-02-26 DIAGNOSIS — J32.4 CHRONIC PANSINUSITIS: Primary | ICD-10-CM

## 2019-02-26 PROCEDURE — 99999 PR PBB SHADOW E&M-EST. PATIENT-LVL III: CPT | Mod: PBBFAC,,, | Performed by: OTOLARYNGOLOGY

## 2019-02-26 PROCEDURE — 3008F PR BODY MASS INDEX (BMI) DOCUMENTED: ICD-10-PCS | Mod: CPTII,S$GLB,, | Performed by: OTOLARYNGOLOGY

## 2019-02-26 PROCEDURE — 3008F BODY MASS INDEX DOCD: CPT | Mod: CPTII,S$GLB,, | Performed by: OTOLARYNGOLOGY

## 2019-02-26 PROCEDURE — 99999 PR PBB SHADOW E&M-EST. PATIENT-LVL III: ICD-10-PCS | Mod: PBBFAC,,, | Performed by: OTOLARYNGOLOGY

## 2019-02-26 PROCEDURE — 99214 PR OFFICE/OUTPT VISIT, EST, LEVL IV, 30-39 MIN: ICD-10-PCS | Mod: S$GLB,,, | Performed by: OTOLARYNGOLOGY

## 2019-02-26 PROCEDURE — 99214 OFFICE O/P EST MOD 30 MIN: CPT | Mod: S$GLB,,, | Performed by: OTOLARYNGOLOGY

## 2019-02-26 NOTE — PATIENT INSTRUCTIONS
Endoscopic Sinus Surgery  The sinuses are hollow areas formed by the bones of the face. Normally, a thin layer of mucus drains from the sinuses into the nose. If the drainage path is blocked, problems such as infection can result. Endoscopic sinus surgery can be done to help clear blockages. The surgeon uses a thin, lighted tube (endoscope) that is put into your nose. The tube lets the doctor see and operate inside your nose and sinuses.     Straightening the septum       Removing polyps         Opening the ethmoid sinuses       Clearing the outflow pathway      Straightening the septum  The septum is a piece of cartilage and bone that runs straight down the inside of the nose. It divides the nose into two sides.  A deviated septum is crooked instead of straight. A crooked septum can cause breathing problems. To fix a deviated septum, the doctor reshapes or trims the cartilage and bone. There is enough septum left for the nose to hold its shape. But the air has more space to move in and out of the nose. This improves your breathing.  Removing polyps  Polyps are small growths. They can grow in both the nose and sinuses. The surgeon may use different methods to remove them. Often, the surgeon uses special tools to remove polyps without harming nearby tissues.  Opening the ethmoid sinuses  The ethmoid sinuses are made up of many small air spaces, like a honeycomb. Like the other sinuses, the ethmoids have a lining that makes mucus. In some cases the drainage path is blocked. The doctor may open the thin walls of bone that separate the air spaces. This creates a passage for mucus to drain more easily.  Clearing the major outflow pathway of the sinuses  The osteomeatal complex is a term for a major outflow tract of your sinuses. Similar to a traffic jam, when this area becomes blocked, you may get symptoms in your maxillary, ethmoid, and frontal sinuses. Opening this area is a primary step in most sinus surgeries. The  uncinate process is a small piece of bone and tissue in the sinuses. It forms an outlet for part of the sinuses. If this tissue is swollen (inflamed), it will block drainage of mucus. The doctor may remove the uncinate process so that mucus can drain.  Date Last Reviewed: 10/1/2016  © 8824-6226 [employer]. [address] [place] [address], [place] [address]. All rights reserved. This information is not intended as a substitute for professional medical care. Always follow your healthcare professional's instructions.        Nasal Irrigations  This will help remove the allergens, debris, and mucous from your nose to help you breathe. It will also clear it in preparation for other nasal medications.    To perform, purchase an over the counter sinus irrigation kit such as the [employer] Sinus Rinse Kit. Use as directed on the box. You should use distilled water or water that was previously boiled and left to cool. If you wish, you may make your own solution. However, salt packets are available in the nasal section in your  drug store.     A rough estimate for making salt solution is:  8oz water  2 teaspoons salt (pickling, argentina or Kosher salt)  1 teaspoon baking soda    After each use, rinse the bottle with small amount of rubbing alcohol and clean with soap.  Replace the irrigation bottle if it becomes visibly soiled or every few weeks.

## 2019-02-26 NOTE — PROGRESS NOTES
Subjective:       Patient ID: Vandana Cobos is a 38 y.o. female.    Chief Complaint: Follow-up (nasal congestion)      Vandana is here for follow-up of chronic sinusitis and nasal polyposis.     HPI 1/16/2019: Symptoms have been present for years and generally her baseline is always with some degree of nasal symptoms. She has periods of infections (~ 3 over the past year) but endorses chronic symptoms that are more bothersome.  Baseline is as below.   Post-nasal drainage: yes  Pressure: yes, intermittent   Congestion: yes, constant  Decreased sense of smell: yes, more often than not  Therapies tried: 3 rounds of antibiotics, including Omnicef x 2 (for strep throat) which was treated concurrently with her baseline sinus symptoms and did not provide reliable improvement. Oral steroids provide a lot of relief. Saline spray, Flonase intermittently. Oral AH does not help.   Seasonal variation: worse in winter.      Has had 3 episodes of strep tonsillitis since November (2-3 months) with 3 rounds of antibiotics. In between episodes, her sore throat improved. Since the most recent episode, her throat has been doing better. Completed last round of antibiotics < 1 week ago. Prior to this she did not have recurrent tonsillitis.      Allergy testing: yes, as a child - unsure if current allergies but does endorse sneezing, ocular symptoms, and as above  History of asthma: no  Anticoagulation: no   Pertinent surgical history: no nasal sinus surgery   Pertinent medical issues: Glaucoma suspect.      SNOT-22=37    Interval 02/26/2019:  Following CT, we did 7 days of prednisone. She felt everything did improve a little with the steroid, but her symptoms worsened a little again because her child was sick.   She has been using her intranasal steroid.   Overall doing OK. Likely has room for improvement, but a little better than before. She still has congestion, nasal drainage, pressure, and decr smell.    Review of Systems    Constitutional: Negative for activity change and appetite change.   Respiratory: Negative for difficulty breathing and wheezing   Cardiovascular: Negative for chest pain.      Objective:        Constitutional:   Vital signs are normal. She appears well-developed and well-nourished.     Head:  Normocephalic and atraumatic.     Ears:  Hearing normal to normal and whispered voice; external ear normal without scars, lesions, or masses; ear canal, tympanic membrane, and middle ear normal..     Nose:  Nose normal including turbinates, nasal mucosa, sinuses and nasal septum. Septal deviation (mild-mod left) and polyps (MM) present.     Mouth/Throat  Oropharynx clear and moist without lesions or asymmetry.     Neck:  Neck normal without thyromegaly masses, asymmetry, normal tracheal structure, crepitus, and tenderness.         Tests / Results:  I personally reviewed the CT sinus and my findings reveal:  Bilateral intranasal polyposis with obstruction of OMC.  Mild maxillary mucosal thickening bilaterally.  Mild anterior ethmoid thickening along lateral aspect middle turbinate. Trace thickening in the right sphenoid but overall posterior sphenoid ethmoidal drainage pathway appears patent.     Assessment:       1. Chronic pansinusitis    2. Nasal polyposis    3. Nasal septal deviation          Plan:         Discussed options: continued medical management with INS and saline and periodic abx / steroid. Also discussed adding topical steroid to rinse as additional medical option. Discussed likely ultimate need for surgery, which we could do at any point she desired. Would be bilat max, bilat anterior ethmoid, possible septum. Would likely place Propel and remove after 7-10 days due to glaucoma suspect. She would like to think about it and will try to set aside some time to consider surgery. Will call me to set follow-up or date.    I discussed at length the risk of nasal /  sinus surgery with the patient including, but not  limited to, recurrence/persistence of symptoms, bleeding, infection, tearing abnormality, septal perforation, tooth or cheek numbness, vision changes, orbital injury, CSF leak and changes in smell.  The patient had opportunity to ask questions and I answered all of them to their satisfaction.  We will proceed as planned.

## 2019-04-11 ENCOUNTER — OFFICE VISIT (OUTPATIENT)
Dept: OPTOMETRY | Facility: CLINIC | Age: 39
End: 2019-04-11
Payer: COMMERCIAL

## 2019-04-11 DIAGNOSIS — Z46.0 CONTACT LENS/GLASSES FITTING: Primary | ICD-10-CM

## 2019-04-11 DIAGNOSIS — H40.013 OAG (OPEN ANGLE GLAUCOMA) SUSPECT, LOW RISK, BILATERAL: Primary | ICD-10-CM

## 2019-04-11 DIAGNOSIS — H52.13 MYOPIA OF BOTH EYES: ICD-10-CM

## 2019-04-11 DIAGNOSIS — Z46.0 CONTACT LENS/GLASSES FITTING: ICD-10-CM

## 2019-04-11 PROCEDURE — 99499 NO LOS: ICD-10-PCS | Mod: S$GLB,,, | Performed by: OPTOMETRIST

## 2019-04-11 PROCEDURE — 92014 COMPRE OPH EXAM EST PT 1/>: CPT | Mod: S$GLB,,, | Performed by: OPTOMETRIST

## 2019-04-11 PROCEDURE — 92310 CONTACT LENS FITTING OU: CPT | Mod: ,,, | Performed by: OPTOMETRIST

## 2019-04-11 PROCEDURE — 92015 PR REFRACTION: ICD-10-PCS | Mod: S$GLB,,, | Performed by: OPTOMETRIST

## 2019-04-11 PROCEDURE — 99999 PR PBB SHADOW E&M-EST. PATIENT-LVL II: CPT | Mod: PBBFAC,,, | Performed by: OPTOMETRIST

## 2019-04-11 PROCEDURE — 92310 PR CONTACT LENS FITTING (NO CHANGE): ICD-10-PCS | Mod: ,,, | Performed by: OPTOMETRIST

## 2019-04-11 PROCEDURE — 99499 UNLISTED E&M SERVICE: CPT | Mod: S$GLB,,, | Performed by: OPTOMETRIST

## 2019-04-11 PROCEDURE — 92015 DETERMINE REFRACTIVE STATE: CPT | Mod: S$GLB,,, | Performed by: OPTOMETRIST

## 2019-04-11 PROCEDURE — 99999 PR PBB SHADOW E&M-EST. PATIENT-LVL II: ICD-10-PCS | Mod: PBBFAC,,, | Performed by: OPTOMETRIST

## 2019-04-11 PROCEDURE — 92014 PR EYE EXAM, EST PATIENT,COMPREHESV: ICD-10-PCS | Mod: S$GLB,,, | Performed by: OPTOMETRIST

## 2019-04-11 NOTE — PATIENT INSTRUCTIONS
GLAUCOMA SUSPECT    Glaucoma is a condition in which damage occurs to the optic nerve inside the eye.  The optic nerve is the wire that transmits vision signals to the brain.   It is typically, but not always, associated with a painless increase in eye pressure over time. In some cases if untreated, Glaucoma can cause blindness.    A Glaucoma Suspect could be defined as a patient that has one or more of the following signs:    Elevated eye pressure.  Enlarged optic nerve head cupping.  Narrow anterior chamber angle.      Tests that may be performed to rule out glaucoma as a diagnosis include:    Visual fields- This test measures the health of the optic nerve and the extent of the peripheral vision as compared to normal.  Visual field loss can be consistent with advancing glaucoma.  HRT / OCT imaging- These computerized tests can obtain 3D scanning images of the optic nerve  / optic nerve cupping to compare past and future results.    Corneal thickness / pachymetry- This test measures the thickness of the clear outer surface of the eye, the cornea.  This physical thickness can have a bearing on the measurement of the eye pressure.  Gonioscopy- This measures or grades the anterior drainage angle.  This structure depth is the distance between the cornea (the clear surface layer) and iris (the colored portion) inside the eye.  If this angle is very slim, or narrow, it can impede normal fluid outflow from the eye, and sometimes cause the eye pressure to rise, and damage the optic nerve.  Optic nerve photography- baseline pictures of the optic nerve may be taken for future comparison.      If a diagnosis of Glaucoma is made based on test results, you and your doctor will discuss treatment options. Treatment may include:    Rx Eye Drops- Many Glaucoma patients have their disease controlled with 1or 2 topical (eye drop) medications.    Laser Treatment of the Iris or Anterior Drainage Angle-  Out patient / in office laser  treatments may be used as an alternative / additional therapy to prescription eye drops.  Advanced eye surgeries-  A small percentage of difficult cases may need more involved surgery with a Glaucoma specialist.  This is an eye surgeon that specializes in the treatment of Glaucoma.      FLASHES / FLOATERS / POSTERIOR VITREOUS DETACHMENT    Call the clinic if you have any further changes in symptoms.  Including:  Increased numbers of floaters or flashing lights, dimness or darkness that moves through or stays constant in your vision, or any pain in the eye (s).

## 2019-04-11 NOTE — PROGRESS NOTES
HPI     Routine eye exam with contacts--dle-12/17 Rickie    Pt denies any blurred vision. Needing updated glasses and contacts lens   rx. No eye pain. No flashes or floaters.     Last edited by Melinda Chen on 4/11/2019  1:15 PM. (History)        ROS     Positive for: Eyes    Negative for: Constitutional, Gastrointestinal, Neurological, Skin,   Genitourinary, Musculoskeletal, HENT, Endocrine, Cardiovascular,   Respiratory, Psychiatric, Allergic/Imm, Heme/Lymph    Last edited by JIGNESH Lopez, OD on 4/11/2019  1:30 PM. (History)        Assessment /Plan     For exam results, see Encounter Report.    OAG (open angle glaucoma) suspect, low risk, bilateral    Myopia of both eyes - Both Eyes    Contact lens/glasses fitting      1. Large c/d OD slight > OS, angles open, low teens IOP, remote fhx mgfather glaucoma  Previous oct rnfl as normal   Patient defers DFE today  Will obtain oct / consider fields next exam    2. Updated specs, gave copy    3. Updated clrx, gave copy and new trials, no changes    DAILY WEAR CONTACT LENSES  Continue with Daily Wear of contact lenses, up to all waking hours.  Continue with approved contact lens disinfection / rewetting drops as discussed.  Dispose of lenses monthly.  Stop wearing your lenses and call our office if redness, blurred vision, or pain persists more than 12 hours.  We recommend an annual eye exam for contact lens patients.    Discussed and educated patient on current findings /plan.  RTC 1 year, prn if any changes / issues

## 2019-04-18 ENCOUNTER — OFFICE VISIT (OUTPATIENT)
Dept: FAMILY MEDICINE | Facility: CLINIC | Age: 39
End: 2019-04-18
Payer: COMMERCIAL

## 2019-04-18 VITALS
DIASTOLIC BLOOD PRESSURE: 56 MMHG | WEIGHT: 133.69 LBS | HEIGHT: 64 IN | BODY MASS INDEX: 22.82 KG/M2 | TEMPERATURE: 99 F | SYSTOLIC BLOOD PRESSURE: 118 MMHG

## 2019-04-18 DIAGNOSIS — J02.9 SORE THROAT: Primary | ICD-10-CM

## 2019-04-18 LAB
CTP QC/QA: YES
S PYO RRNA THROAT QL PROBE: NEGATIVE

## 2019-04-18 PROCEDURE — 99213 OFFICE O/P EST LOW 20 MIN: CPT | Mod: S$GLB,,, | Performed by: FAMILY MEDICINE

## 2019-04-18 PROCEDURE — 3008F BODY MASS INDEX DOCD: CPT | Mod: CPTII,S$GLB,, | Performed by: FAMILY MEDICINE

## 2019-04-18 PROCEDURE — 99999 PR PBB SHADOW E&M-EST. PATIENT-LVL III: CPT | Mod: PBBFAC,,, | Performed by: FAMILY MEDICINE

## 2019-04-18 PROCEDURE — 87880 POCT RAPID STREP A: ICD-10-PCS | Mod: QW,S$GLB,, | Performed by: FAMILY MEDICINE

## 2019-04-18 PROCEDURE — 3008F PR BODY MASS INDEX (BMI) DOCUMENTED: ICD-10-PCS | Mod: CPTII,S$GLB,, | Performed by: FAMILY MEDICINE

## 2019-04-18 PROCEDURE — 99213 PR OFFICE/OUTPT VISIT, EST, LEVL III, 20-29 MIN: ICD-10-PCS | Mod: S$GLB,,, | Performed by: FAMILY MEDICINE

## 2019-04-18 PROCEDURE — 99999 PR PBB SHADOW E&M-EST. PATIENT-LVL III: ICD-10-PCS | Mod: PBBFAC,,, | Performed by: FAMILY MEDICINE

## 2019-04-18 PROCEDURE — 87081 CULTURE SCREEN ONLY: CPT

## 2019-04-18 PROCEDURE — 87880 STREP A ASSAY W/OPTIC: CPT | Mod: QW,S$GLB,, | Performed by: FAMILY MEDICINE

## 2019-04-18 PROCEDURE — 87147 CULTURE TYPE IMMUNOLOGIC: CPT

## 2019-04-18 RX ORDER — PENICILLIN V POTASSIUM 500 MG/1
500 TABLET, FILM COATED ORAL 2 TIMES DAILY
Qty: 20 TABLET | Refills: 0 | Status: SHIPPED | OUTPATIENT
Start: 2019-04-18 | End: 2019-04-20 | Stop reason: SDUPTHER

## 2019-04-18 NOTE — PROGRESS NOTES
"Subjective:       Patient ID: Vandana Cobos is a 38 y.o. female.    Chief Complaint: Sore Throat (ST, L ear pain, PND, neck tenderness w/ touch, minimal sinus symptoms. Kids recently had strep)    24 hr of sore throat.  Painful swallowing.  Some tender anterior cervical lymph node areas.  No cough.  No fever.  Children had strep throat 2-4 weeks ago.  She had a persistent/recurring strep throat in November of last year.  I reviewed those notes and culture results.  She does have some ongoing nasal symptoms and has seen ENT.    Review of Systems   Constitutional: Negative for fever.   HENT: Positive for congestion, sore throat and trouble swallowing. Negative for ear pain.    Respiratory: Negative for cough.        Objective:     Blood pressure (!) 118/56, temperature 99 °F (37.2 °C), temperature source Oral, height 5' 4" (1.626 m), weight 60.6 kg (133 lb 11.3 oz).      Physical Exam   Constitutional: She appears well-developed and well-nourished. No distress.   HENT:   Sinus is nontender. Mild nasal mucosal edema.  Mild redness of the tonsillar pillars.  No visible tonsillar tissue.  No exudate.  TMs are clear.   Neck: No thyromegaly present.   Cardiovascular: Normal rate and regular rhythm.   Pulmonary/Chest: Effort normal.   Abdominal: Soft. Bowel sounds are normal. She exhibits no mass. There is no tenderness.   Lymphadenopathy:     She has cervical adenopathy (Tender upper anterior cervical areas more so on the left.).       Assessment:       1. Sore throat        Plan:       Rapid strep negative.  Symptomatic care discussed.  Culture sent.  Written prescription for Pen-VK to be filled tomorrow or the next day if her throat is worse.      "

## 2019-04-18 NOTE — PATIENT INSTRUCTIONS
For a sore throat:  Slice a lemon very thin.  Place in sauce pan with 2 big glops of honey  Simmer low heat for 30 minutes. Dilute with tea or water and sip to soothe and heal your sore raw throat.   If your throat is worse tomorrow, start penicillin

## 2019-04-19 ENCOUNTER — NURSE TRIAGE (OUTPATIENT)
Dept: ADMINISTRATIVE | Facility: CLINIC | Age: 39
End: 2019-04-19

## 2019-04-20 ENCOUNTER — PATIENT MESSAGE (OUTPATIENT)
Dept: FAMILY MEDICINE | Facility: CLINIC | Age: 39
End: 2019-04-20

## 2019-04-20 RX ORDER — PENICILLIN V POTASSIUM 500 MG/1
500 TABLET, FILM COATED ORAL 2 TIMES DAILY
Qty: 20 TABLET | Refills: 0 | Status: SHIPPED | OUTPATIENT
Start: 2019-04-20 | End: 2019-04-30

## 2019-04-20 NOTE — TELEPHONE ENCOUNTER
Reason for Disposition   Caller requesting a NON-URGENT new prescription or refill and triager unable to refill per unit policy    Protocols used: MEDICATION QUESTION CALL-A-JOSELIN Ross is in [place], [place].  She saw Dr Bull yesterday in clinic for sore throat.  Negative swab for strep, culture still pending.  Dr Bull gave her a script for antibiotic, and told her to begin taking if she was still feeling bad today. She began taking the Veetid today because her throat is still sore and the glands in her neck were still swollen today.  She left the Veetid bottle that she got filled today at home in [place], did not bring it with her on this trip, and is asking for a new prescription to be called in to [facility], in [place], [place], phone # [phone].  She said it is a 4 hour trip to come home and get her forgotten medication, and they are not coming back until Monday.  Message to Dr Bull.  Please contact caller directly with any additional care advice at [phone].

## 2019-04-21 LAB — BACTERIA THROAT CULT: NORMAL

## 2019-05-14 ENCOUNTER — OFFICE VISIT (OUTPATIENT)
Dept: FAMILY MEDICINE | Facility: CLINIC | Age: 39
End: 2019-05-14
Payer: COMMERCIAL

## 2019-05-14 VITALS
HEIGHT: 64 IN | BODY MASS INDEX: 22.9 KG/M2 | SYSTOLIC BLOOD PRESSURE: 108 MMHG | OXYGEN SATURATION: 98 % | HEART RATE: 87 BPM | WEIGHT: 134.13 LBS | DIASTOLIC BLOOD PRESSURE: 64 MMHG

## 2019-05-14 DIAGNOSIS — J02.9 SORE THROAT: Primary | ICD-10-CM

## 2019-05-14 LAB
CTP QC/QA: YES
S PYO RRNA THROAT QL PROBE: NEGATIVE

## 2019-05-14 PROCEDURE — 3008F PR BODY MASS INDEX (BMI) DOCUMENTED: ICD-10-PCS | Mod: CPTII,S$GLB,, | Performed by: FAMILY MEDICINE

## 2019-05-14 PROCEDURE — 3008F BODY MASS INDEX DOCD: CPT | Mod: CPTII,S$GLB,, | Performed by: FAMILY MEDICINE

## 2019-05-14 PROCEDURE — 99213 PR OFFICE/OUTPT VISIT, EST, LEVL III, 20-29 MIN: ICD-10-PCS | Mod: 25,S$GLB,, | Performed by: FAMILY MEDICINE

## 2019-05-14 PROCEDURE — 99213 OFFICE O/P EST LOW 20 MIN: CPT | Mod: 25,S$GLB,, | Performed by: FAMILY MEDICINE

## 2019-05-14 PROCEDURE — 87880 POCT RAPID STREP A: ICD-10-PCS | Mod: QW,S$GLB,, | Performed by: FAMILY MEDICINE

## 2019-05-14 PROCEDURE — 99999 PR PBB SHADOW E&M-EST. PATIENT-LVL III: ICD-10-PCS | Mod: PBBFAC,,, | Performed by: FAMILY MEDICINE

## 2019-05-14 PROCEDURE — 99999 PR PBB SHADOW E&M-EST. PATIENT-LVL III: CPT | Mod: PBBFAC,,, | Performed by: FAMILY MEDICINE

## 2019-05-14 PROCEDURE — 87880 STREP A ASSAY W/OPTIC: CPT | Mod: QW,S$GLB,, | Performed by: FAMILY MEDICINE

## 2019-05-14 RX ORDER — DOXYCYCLINE 100 MG/1
100 CAPSULE ORAL EVERY 12 HOURS
Qty: 10 CAPSULE | Refills: 0 | Status: SHIPPED | OUTPATIENT
Start: 2019-05-14 | End: 2019-05-19

## 2019-05-14 NOTE — PROGRESS NOTES
"Assessment and Plan:    1. Sore throat  - Antibiotic given with parameters  - Likely a viral infection vs Allergies  - Discussed compliance with flonase and allegra  - POCT Rapid Strep A : negative  - doxycycline (VIBRAMYCIN) 100 MG Cap; Take 1 capsule (100 mg total) by mouth every 12 (twelve) hours. for 5 days  Dispense: 10 capsule; Refill: 0        ______________________________________________________________________  Subjective:    Chief Complaint:  Chief Complaint   Patient presents with    Sore Throat     5 days, had strep throat 4 times since nov        HPI:  Vandana is a 38 y.o. year old     Medications:  Current Outpatient Medications on File Prior to Visit   Medication Sig Dispense Refill    escitalopram oxalate (LEXAPRO) 20 MG tablet Take 1 tablet (20 mg total) by mouth once daily. 7 tablet 0    fexofenadine (ALLEGRA) 60 MG tablet Take 60 mg by mouth once daily.      fluticasone (FLONASE) 50 mcg/actuation nasal spray 1 spray (50 mcg total) by Each Nare route once daily. 16 g 0    MV-MN/IRON FUM/FA/OMEGA3,6,9#3 (WOMEN'S MULTI ORAL) Take by mouth.       No current facility-administered medications on file prior to visit.        Review of Systems:  Review of Systems   Constitutional: Negative for fever.   HENT: Positive for sore throat.    Respiratory: Negative for cough and shortness of breath.    Cardiovascular: Negative for chest pain.   Gastrointestinal: Negative for abdominal pain, diarrhea, nausea and vomiting.   Skin: Negative for rash.   Psychiatric/Behavioral: Negative for dysphoric mood.       Past Medical History:  Past Medical History:   Diagnosis Date    ALLERGIC RHINITIS        Objective:    Vitals:  Vitals:    05/14/19 1353   BP: 108/64   Pulse: 87   SpO2: 98%   Weight: 60.9 kg (134 lb 2.4 oz)   Height: 5' 4" (1.626 m)   PainSc:   4   PainLoc: Throat       Physical Exam   Constitutional: No distress.   HENT:   Head: Normocephalic and atraumatic.   Nose: Mucosal edema and rhinorrhea " present.   Mouth/Throat: Posterior oropharyngeal erythema present.   Eyes: Pupils are equal, round, and reactive to light. EOM are normal.   Neck: Neck supple.   Cardiovascular: Normal rate and regular rhythm. Exam reveals no friction rub.   No murmur heard.  Pulmonary/Chest: Effort normal and breath sounds normal.   Abdominal: Soft. Bowel sounds are normal. She exhibits no distension. There is no tenderness.   Skin: Skin is warm and dry. No rash noted.   Psychiatric: She has a normal mood and affect. Her behavior is normal.           Glen Cerna MD  Family Medicine

## 2019-06-20 ENCOUNTER — OFFICE VISIT (OUTPATIENT)
Dept: FAMILY MEDICINE | Facility: CLINIC | Age: 39
End: 2019-06-20
Payer: COMMERCIAL

## 2019-06-20 VITALS
TEMPERATURE: 99 F | DIASTOLIC BLOOD PRESSURE: 62 MMHG | WEIGHT: 131.19 LBS | SYSTOLIC BLOOD PRESSURE: 102 MMHG | HEIGHT: 64 IN | HEART RATE: 72 BPM | BODY MASS INDEX: 22.4 KG/M2

## 2019-06-20 DIAGNOSIS — J18.9 COMMUNITY ACQUIRED PNEUMONIA OF RIGHT LOWER LOBE OF LUNG: Primary | ICD-10-CM

## 2019-06-20 PROCEDURE — 99999 PR PBB SHADOW E&M-EST. PATIENT-LVL III: ICD-10-PCS | Mod: PBBFAC,,, | Performed by: INTERNAL MEDICINE

## 2019-06-20 PROCEDURE — 99214 OFFICE O/P EST MOD 30 MIN: CPT | Mod: S$GLB,,, | Performed by: INTERNAL MEDICINE

## 2019-06-20 PROCEDURE — 3008F BODY MASS INDEX DOCD: CPT | Mod: CPTII,S$GLB,, | Performed by: INTERNAL MEDICINE

## 2019-06-20 PROCEDURE — 3008F PR BODY MASS INDEX (BMI) DOCUMENTED: ICD-10-PCS | Mod: CPTII,S$GLB,, | Performed by: INTERNAL MEDICINE

## 2019-06-20 PROCEDURE — 99214 PR OFFICE/OUTPT VISIT, EST, LEVL IV, 30-39 MIN: ICD-10-PCS | Mod: S$GLB,,, | Performed by: INTERNAL MEDICINE

## 2019-06-20 PROCEDURE — 99999 PR PBB SHADOW E&M-EST. PATIENT-LVL III: CPT | Mod: PBBFAC,,, | Performed by: INTERNAL MEDICINE

## 2019-06-20 RX ORDER — DOXYCYCLINE HYCLATE 100 MG
100 TABLET ORAL 2 TIMES DAILY
Qty: 14 TABLET | Refills: 0 | Status: SHIPPED | OUTPATIENT
Start: 2019-06-20 | End: 2019-06-27

## 2019-06-20 NOTE — PROGRESS NOTES
Assessment and Plan:    1. Community acquired pneumonia of right lower lobe of lung  Presenting with high fever and cough in setting of exposure to child with known pneumonia. Noted to have RLL faint crackles on exam. Discussed that this may be a viral pna in which case antibiotics may not help, but will treat with doxycycline which she has previously tolerated.   - doxycycline (VIBRA-TABS) 100 MG tablet; Take 1 tablet (100 mg total) by mouth 2 (two) times daily. for 7 days  Dispense: 14 tablet; Refill: 0      ______________________________________________________________________  Subjective:    Chief Complaint:  Concern for PNA    HPI:  Vandana is a 38 y.o. year old woman here with concern for PNA.     Her symptoms started 2 days ago with cough. Started running fever shortly after to 102 and had a lot of chills. She also has had significant fatigue. Cough has been predominantly dry. Cough feels deeper than usual. She has not had any shortness of breath or wheezing.     Her son who had similar symptoms was recently diagnosed with PNA.     Medications:  Current Outpatient Medications on File Prior to Visit   Medication Sig Dispense Refill    escitalopram oxalate (LEXAPRO) 20 MG tablet Take 1 tablet (20 mg total) by mouth once daily. 7 tablet 0    fexofenadine (ALLEGRA) 60 MG tablet Take 60 mg by mouth once daily.      fluticasone (FLONASE) 50 mcg/actuation nasal spray 1 spray (50 mcg total) by Each Nare route once daily. 16 g 0    MV-MN/IRON FUM/FA/OMEGA3,6,9#3 (WOMEN'S MULTI ORAL) Take by mouth.       No current facility-administered medications on file prior to visit.        Review of Systems:  Review of Systems   Constitutional: Positive for chills and fatigue.   HENT: Positive for congestion and rhinorrhea. Negative for sinus pressure, sinus pain, sore throat, trouble swallowing and voice change.    Respiratory: Positive for cough. Negative for chest tightness, shortness of breath and wheezing.   "  Cardiovascular: Negative for chest pain and palpitations.       Past Medical History:  Past Medical History:   Diagnosis Date    ALLERGIC RHINITIS        Objective:    Vitals:  Vitals:    06/20/19 0914   BP: 102/62   Pulse: 72   Temp: 98.7 °F (37.1 °C)   Weight: 59.5 kg (131 lb 2.8 oz)   Height: 5' 4" (1.626 m)   PainSc: 0-No pain       Physical Exam   Constitutional: She is oriented to person, place, and time. She appears well-developed and well-nourished. No distress.   HENT:   Mouth/Throat: Oropharynx is clear and moist. No posterior oropharyngeal edema or posterior oropharyngeal erythema. Tonsils are 0 on the right. Tonsils are 0 on the left. No tonsillar exudate.   Eyes: Conjunctivae are normal. Right eye exhibits no discharge. Left eye exhibits no discharge.   Cardiovascular: Normal rate and regular rhythm.   No murmur heard.  Pulmonary/Chest: Effort normal. No stridor. No respiratory distress. She has no wheezes. She has rales (faint RLL crackles more laterally).   Neurological: She is alert and oriented to person, place, and time.   Skin: Skin is warm and dry.   Psychiatric: She has a normal mood and affect. Her behavior is normal. Judgment and thought content normal.   Vitals reviewed.      Data:  Previous labs reviewed and pertinent for recent strep tests all negative.      Destiny Garrett MD  Internal Medicine  "

## 2019-07-01 ENCOUNTER — OFFICE VISIT (OUTPATIENT)
Dept: FAMILY MEDICINE | Facility: CLINIC | Age: 39
End: 2019-07-01
Payer: COMMERCIAL

## 2019-07-01 VITALS
HEART RATE: 63 BPM | TEMPERATURE: 99 F | OXYGEN SATURATION: 99 % | DIASTOLIC BLOOD PRESSURE: 68 MMHG | BODY MASS INDEX: 22.86 KG/M2 | WEIGHT: 133.94 LBS | HEIGHT: 64 IN | RESPIRATION RATE: 18 BRPM | SYSTOLIC BLOOD PRESSURE: 108 MMHG

## 2019-07-01 DIAGNOSIS — R05.9 COUGH: Primary | ICD-10-CM

## 2019-07-01 PROCEDURE — 99999 PR PBB SHADOW E&M-EST. PATIENT-LVL III: CPT | Mod: PBBFAC,,, | Performed by: FAMILY MEDICINE

## 2019-07-01 PROCEDURE — 3008F BODY MASS INDEX DOCD: CPT | Mod: CPTII,S$GLB,, | Performed by: FAMILY MEDICINE

## 2019-07-01 PROCEDURE — 99213 PR OFFICE/OUTPT VISIT, EST, LEVL III, 20-29 MIN: ICD-10-PCS | Mod: 25,S$GLB,, | Performed by: FAMILY MEDICINE

## 2019-07-01 PROCEDURE — 94640 PR INHAL RX, AIRWAY OBST/DX SPUTUM INDUCT: ICD-10-PCS | Mod: S$GLB,,, | Performed by: FAMILY MEDICINE

## 2019-07-01 PROCEDURE — 3008F PR BODY MASS INDEX (BMI) DOCUMENTED: ICD-10-PCS | Mod: CPTII,S$GLB,, | Performed by: FAMILY MEDICINE

## 2019-07-01 PROCEDURE — 94640 AIRWAY INHALATION TREATMENT: CPT | Mod: S$GLB,,, | Performed by: FAMILY MEDICINE

## 2019-07-01 PROCEDURE — 99213 OFFICE O/P EST LOW 20 MIN: CPT | Mod: 25,S$GLB,, | Performed by: FAMILY MEDICINE

## 2019-07-01 PROCEDURE — 99999 PR PBB SHADOW E&M-EST. PATIENT-LVL III: ICD-10-PCS | Mod: PBBFAC,,, | Performed by: FAMILY MEDICINE

## 2019-07-01 RX ORDER — ALBUTEROL SULFATE 0.83 MG/ML
2.5 SOLUTION RESPIRATORY (INHALATION) ONCE
Status: COMPLETED | OUTPATIENT
Start: 2019-07-01 | End: 2019-07-01

## 2019-07-01 RX ORDER — ALBUTEROL SULFATE 0.83 MG/ML
2.5 SOLUTION RESPIRATORY (INHALATION) EVERY 6 HOURS PRN
Qty: 1 BOX | Refills: 1 | Status: SHIPPED | OUTPATIENT
Start: 2019-07-01 | End: 2020-01-14

## 2019-07-01 RX ADMIN — ALBUTEROL SULFATE 2.5 MG: 0.83 SOLUTION RESPIRATORY (INHALATION) at 03:07

## 2019-07-01 NOTE — PROGRESS NOTES
"Subjective:       Patient ID: Vandana Cobos is a 38 y.o. female.    Chief Complaint: Cough (not resolving, patient feels worsening) and Pneumonia (follow up)    HPI  Patient in the office for f/u and review.  Dx of CAP on 6/20 at visit with Dr Garrett. Given rx doxy x 7 days. Completed rx without issue.  Now afebrile, but coughing to point of gagging and occ emesis.   Cough occ productive with yellow sputum.  No significant pnd or sinus drainage. Using allegra and flonase.     Review of Systems:  Review of Systems   Constitutional: Positive for fatigue. Negative for chills.   HENT: Positive for postnasal drip. Negative for congestion, rhinorrhea, sinus pressure, sinus pain and sore throat.    Respiratory: Positive for cough, chest tightness and wheezing (occ). Negative for shortness of breath.    Cardiovascular: Negative for chest pain and palpitations.   Gastrointestinal: Negative for diarrhea and nausea.   Neurological: Negative for dizziness and headaches.       Objective:     Vitals:    07/01/19 1158   BP: 108/68   BP Location: Left arm   Patient Position: Sitting   BP Method: Large (Manual)   Pulse: 63   Resp: 18   Temp: 98.9 °F (37.2 °C)   TempSrc: Oral   SpO2: 99%   Weight: 60.7 kg (133 lb 14.9 oz)   Height: 5' 4" (1.626 m)          Physical Exam   Constitutional: She is oriented to person, place, and time. She appears well-developed and well-nourished. No distress.   HENT:   Mouth/Throat: Oropharynx is clear and moist. No posterior oropharyngeal edema or posterior oropharyngeal erythema. Tonsils are 0 on the right. Tonsils are 0 on the left. No tonsillar exudate.   Eyes: Conjunctivae are normal. Right eye exhibits no discharge. Left eye exhibits no discharge.   Neck: Normal range of motion. Neck supple.   Cardiovascular: Normal rate and regular rhythm.   No murmur heard.  Pulmonary/Chest: Effort normal. No stridor. No respiratory distress. She has wheezes (rul). She has no rales.   Lymphadenopathy:     She " has no cervical adenopathy.   Neurological: She is alert and oriented to person, place, and time.   Skin: Skin is warm and dry.   Psychiatric: She has a normal mood and affect. Her behavior is normal. Judgment and thought content normal.   Vitals reviewed.         Improvement in ease of respiration, decrease in cough, and decrease in auscultated wheeze/rhonchi noted post-neb.     Assessment & Plan:  Cough  No persistent rales/wheeze noted on exam. Improvement noted post-neb. Recommend albuterol hfa or neb 2-3x/day.   No abx indicated today. Pt instructed to call office asap for fever, incr cough/production, etc.   Ok to use delsym for cough suppression at night.   -     albuterol nebulizer solution 2.5 mg  -     albuterol (PROVENTIL) 2.5 mg /3 mL (0.083 %) nebulizer solution; Take 3 mLs (2.5 mg total) by nebulization every 6 (six) hours as needed for Wheezing. Rescue  Dispense: 1 Box; Refill: 1  Side effects and precautions of medication use reviewed with patient, expressed understanding. No questions or concerns.

## 2019-07-08 ENCOUNTER — TELEPHONE (OUTPATIENT)
Dept: FAMILY MEDICINE | Facility: CLINIC | Age: 39
End: 2019-07-08

## 2019-07-08 NOTE — TELEPHONE ENCOUNTER
----- Message from Regine Mendieta MD sent at 7/3/2019 12:32 PM CDT -----  Please administer the albuterol on her MAR, ordered on 7/1. See note.

## 2019-08-06 RX ORDER — ESCITALOPRAM OXALATE 20 MG/1
TABLET ORAL
Qty: 30 TABLET | Refills: 4 | Status: SHIPPED | OUTPATIENT
Start: 2019-08-06 | End: 2020-03-05

## 2020-01-14 ENCOUNTER — OFFICE VISIT (OUTPATIENT)
Dept: FAMILY MEDICINE | Facility: CLINIC | Age: 40
End: 2020-01-14
Payer: COMMERCIAL

## 2020-01-14 VITALS
OXYGEN SATURATION: 98 % | DIASTOLIC BLOOD PRESSURE: 56 MMHG | BODY MASS INDEX: 23.42 KG/M2 | SYSTOLIC BLOOD PRESSURE: 96 MMHG | TEMPERATURE: 99 F | HEART RATE: 73 BPM | WEIGHT: 136.44 LBS

## 2020-01-14 DIAGNOSIS — J02.9 SORE THROAT: ICD-10-CM

## 2020-01-14 DIAGNOSIS — J02.0 STREP PHARYNGITIS: Primary | ICD-10-CM

## 2020-01-14 PROCEDURE — 99213 PR OFFICE/OUTPT VISIT, EST, LEVL III, 20-29 MIN: ICD-10-PCS | Mod: S$GLB,,, | Performed by: PHYSICIAN ASSISTANT

## 2020-01-14 PROCEDURE — 87081 CULTURE SCREEN ONLY: CPT

## 2020-01-14 PROCEDURE — 3008F PR BODY MASS INDEX (BMI) DOCUMENTED: ICD-10-PCS | Mod: CPTII,S$GLB,, | Performed by: PHYSICIAN ASSISTANT

## 2020-01-14 PROCEDURE — 3008F BODY MASS INDEX DOCD: CPT | Mod: CPTII,S$GLB,, | Performed by: PHYSICIAN ASSISTANT

## 2020-01-14 PROCEDURE — 87147 CULTURE TYPE IMMUNOLOGIC: CPT

## 2020-01-14 PROCEDURE — 99999 PR PBB SHADOW E&M-EST. PATIENT-LVL III: CPT | Mod: PBBFAC,,, | Performed by: PHYSICIAN ASSISTANT

## 2020-01-14 PROCEDURE — 99999 PR PBB SHADOW E&M-EST. PATIENT-LVL III: ICD-10-PCS | Mod: PBBFAC,,, | Performed by: PHYSICIAN ASSISTANT

## 2020-01-14 PROCEDURE — 99213 OFFICE O/P EST LOW 20 MIN: CPT | Mod: S$GLB,,, | Performed by: PHYSICIAN ASSISTANT

## 2020-01-14 RX ORDER — AMOXICILLIN AND CLAVULANATE POTASSIUM 875; 125 MG/1; MG/1
1 TABLET, FILM COATED ORAL EVERY 12 HOURS
Qty: 14 TABLET | Refills: 0 | Status: SHIPPED | OUTPATIENT
Start: 2020-01-14 | End: 2020-01-21

## 2020-01-14 NOTE — PROGRESS NOTES
Subjective:      Patient ID: Vandana Cobos is a 39 y.o. female.    Chief Complaint: Sore Throat (fever night before, started sunday)  Patient is new to me.    HPI   Patient has had a sore throat since Sunday.  Patient reports fever and headache on Sunday.  History of recurrent strep infections.  Evaluated by Dr. Vallejo in the past.    Review of Systems   Constitutional: Positive for chills and fever (subjective). Negative for appetite change.   HENT: Positive for postnasal drip and sore throat. Negative for ear pain and trouble swallowing.    Eyes: Negative for pain.   Respiratory: Negative for cough and shortness of breath.    Cardiovascular: Negative for chest pain.   Gastrointestinal: Negative for abdominal pain, constipation, diarrhea, nausea and vomiting.   Musculoskeletal: Positive for myalgias.   Skin: Negative for rash.   Neurological: Positive for headaches (Sunday). Negative for dizziness and light-headedness.       Objective:   BP (!) 96/56 (BP Location: Left arm, Patient Position: Sitting)   Pulse 73   Temp 98.8 °F (37.1 °C)   Wt 61.9 kg (136 lb 7.4 oz)   LMP 01/01/2020 (Approximate)   SpO2 98%   BMI 23.42 kg/m²      Physical Exam   Constitutional: She is oriented to person, place, and time. Vital signs are normal. She appears well-developed and well-nourished. She is active and cooperative. No distress.   HENT:   Head: Normocephalic and atraumatic.   Right Ear: Hearing, tympanic membrane, external ear and ear canal normal.   Left Ear: Hearing, tympanic membrane, external ear and ear canal normal.   Nose: Nose normal. Right sinus exhibits no maxillary sinus tenderness and no frontal sinus tenderness. Left sinus exhibits no maxillary sinus tenderness and no frontal sinus tenderness.   Mouth/Throat: Uvula is midline and mucous membranes are normal. Posterior oropharyngeal erythema present. Tonsils are 2+ on the right. Tonsils are 2+ on the left. No tonsillar exudate.   Eyes: Conjunctivae and lids  are normal.   Neck: Normal range of motion and phonation normal. Neck supple.   Cardiovascular: Normal rate, regular rhythm and normal heart sounds. Exam reveals no gallop and no friction rub.   No murmur heard.  Pulmonary/Chest: Effort normal and breath sounds normal. No stridor. No respiratory distress. She has no decreased breath sounds. She has no wheezes. She has no rhonchi. She has no rales.   Musculoskeletal: Normal range of motion.   Lymphadenopathy:     She has cervical adenopathy (TTP).   Neurological: She is alert and oriented to person, place, and time.   Skin: Skin is warm, dry and intact. No rash noted.   Psychiatric: She has a normal mood and affect. Her speech is normal and behavior is normal. Judgment and thought content normal. Cognition and memory are normal.   Vitals reviewed.     Assessment:      1. Strep pharyngitis    2. Sore throat       Plan:   1. Strep pharyngitis  Take Augmentin twice a day for 7 days and take with food.  Take probiotics daily.  Use warm saltwater gargles to soothe throat.  - amoxicillin-clavulanate 875-125mg (AUGMENTIN) 875-125 mg per tablet; Take 1 tablet by mouth every 12 (twelve) hours. for 7 days  Dispense: 14 tablet; Refill: 0  - Strep A culture, throat    2. Sore throat  - POCT Rapid Strep A-positive  - Strep A culture, throat    Follow up in 6 months with Dr. Chou.  Patient agreed with plan and expressed understanding.

## 2020-01-14 NOTE — PATIENT INSTRUCTIONS
Take Augmentin twice a day for 7 days and take with food.  Take probiotics daily.    Use warm saltwater gargles to soothe throat.    Thanks for seeing me,  Calista Fitzpatrick PA-C

## 2020-01-16 LAB — BACTERIA THROAT CULT: ABNORMAL

## 2020-01-17 NOTE — PROGRESS NOTES
Your culture showed the same organism as before.  Are your symptoms improving?    Thanks!  Calista Fitzpatrick PA-C

## 2020-01-20 ENCOUNTER — TELEPHONE (OUTPATIENT)
Dept: FAMILY MEDICINE | Facility: CLINIC | Age: 40
End: 2020-01-20

## 2020-01-20 NOTE — TELEPHONE ENCOUNTER
----- Message from Calista Fitzpatrick PA-C sent at 1/14/2020  1:45 PM CST -----  See if she is feeling better from strep.

## 2020-01-22 ENCOUNTER — TELEPHONE (OUTPATIENT)
Dept: FAMILY MEDICINE | Facility: CLINIC | Age: 40
End: 2020-01-22

## 2020-01-28 ENCOUNTER — OFFICE VISIT (OUTPATIENT)
Dept: URGENT CARE | Facility: CLINIC | Age: 40
End: 2020-01-28
Payer: COMMERCIAL

## 2020-01-28 VITALS
HEIGHT: 64 IN | SYSTOLIC BLOOD PRESSURE: 96 MMHG | OXYGEN SATURATION: 100 % | RESPIRATION RATE: 16 BRPM | HEART RATE: 78 BPM | BODY MASS INDEX: 23.22 KG/M2 | WEIGHT: 136 LBS | DIASTOLIC BLOOD PRESSURE: 59 MMHG | TEMPERATURE: 98 F

## 2020-01-28 DIAGNOSIS — R09.81 SINUS CONGESTION: ICD-10-CM

## 2020-01-28 DIAGNOSIS — J32.9 SINUSITIS, UNSPECIFIED CHRONICITY, UNSPECIFIED LOCATION: ICD-10-CM

## 2020-01-28 DIAGNOSIS — J06.9 VIRAL URI: Primary | ICD-10-CM

## 2020-01-28 PROCEDURE — 99214 PR OFFICE/OUTPT VISIT, EST, LEVL IV, 30-39 MIN: ICD-10-PCS | Mod: S$GLB,,, | Performed by: PHYSICIAN ASSISTANT

## 2020-01-28 PROCEDURE — 99214 OFFICE O/P EST MOD 30 MIN: CPT | Mod: S$GLB,,, | Performed by: PHYSICIAN ASSISTANT

## 2020-01-28 RX ORDER — FLUTICASONE PROPIONATE 50 MCG
1 SPRAY, SUSPENSION (ML) NASAL DAILY
Qty: 16 G | Refills: 0 | Status: SHIPPED | OUTPATIENT
Start: 2020-01-28 | End: 2020-02-18

## 2020-01-28 RX ORDER — PROMETHAZINE HYDROCHLORIDE AND DEXTROMETHORPHAN HYDROBROMIDE 6.25; 15 MG/5ML; MG/5ML
5 SYRUP ORAL NIGHTLY PRN
Qty: 180 ML | Refills: 1 | Status: SHIPPED | OUTPATIENT
Start: 2020-01-28 | End: 2020-02-07

## 2020-01-28 RX ORDER — BENZONATATE 200 MG/1
200 CAPSULE ORAL 3 TIMES DAILY PRN
Qty: 60 CAPSULE | Refills: 1 | Status: SHIPPED | OUTPATIENT
Start: 2020-01-28 | End: 2020-02-07

## 2020-01-28 RX ORDER — METHYLPREDNISOLONE 4 MG/1
TABLET ORAL
Qty: 1 PACKAGE | Refills: 0 | Status: SHIPPED | OUTPATIENT
Start: 2020-01-28 | End: 2020-02-18 | Stop reason: ALTCHOICE

## 2020-01-28 NOTE — PROGRESS NOTES
"Subjective:       Patient ID: Vandana Cobos is a 39 y.o. female.    Vitals:  height is 5' 4" (1.626 m) and weight is 61.7 kg (136 lb). Her temperature is 98 °F (36.7 °C). Her blood pressure is 96/59 (abnormal) and her pulse is 78. Her respiration is 16 and oxygen saturation is 100%.     Chief Complaint: Sinus Problem    Pt c/o sinus symptoms x 4 days.     Sinus Problem   This is a new problem. The current episode started in the past 7 days. The problem is unchanged. There has been no fever. Associated symptoms include congestion, coughing, sinus pressure and sneezing. Pertinent negatives include no chills, diaphoresis, ear pain, shortness of breath or sore throat. Past treatments include oral decongestants. The treatment provided mild relief.       Constitution: Negative for chills, sweating, fatigue and fever.   HENT: Positive for congestion and sinus pressure. Negative for ear pain, sinus pain, sore throat and voice change.    Neck: Negative for painful lymph nodes.   Eyes: Negative for eye redness.   Respiratory: Positive for cough. Negative for chest tightness, sputum production, bloody sputum, COPD, shortness of breath, stridor, wheezing and asthma.    Gastrointestinal: Negative for nausea and vomiting.   Musculoskeletal: Negative for muscle ache.   Skin: Negative for rash.   Allergic/Immunologic: Positive for seasonal allergies and sneezing. Negative for asthma.   Hematologic/Lymphatic: Negative for swollen lymph nodes.       Objective:      Physical Exam   Constitutional: She is oriented to person, place, and time. She appears well-developed and well-nourished. She is cooperative.  Non-toxic appearance. She does not have a sickly appearance. She does not appear ill. No distress.   HENT:   Head: Normocephalic and atraumatic.   Right Ear: Hearing, external ear and ear canal normal. Tympanic membrane is bulging. Tympanic membrane is not injected and not erythematous.   Left Ear: Hearing, external ear and ear " canal normal. Tympanic membrane is bulging. Tympanic membrane is not injected and not erythematous.   Nose: Nose normal. No mucosal edema, rhinorrhea or nasal deformity. No epistaxis. Right sinus exhibits no maxillary sinus tenderness and no frontal sinus tenderness. Left sinus exhibits no maxillary sinus tenderness and no frontal sinus tenderness.   Mouth/Throat: Uvula is midline, oropharynx is clear and moist and mucous membranes are normal. No trismus in the jaw. Normal dentition. No uvula swelling. No oropharyngeal exudate, posterior oropharyngeal edema or posterior oropharyngeal erythema.   Eyes: Conjunctivae and lids are normal. No scleral icterus.   Neck: Trachea normal, full passive range of motion without pain and phonation normal. Neck supple. No neck rigidity. No edema and no erythema present.   Cardiovascular: Normal rate, regular rhythm, normal heart sounds, intact distal pulses and normal pulses.   Pulmonary/Chest: Effort normal and breath sounds normal. No respiratory distress. She has no decreased breath sounds. She has no rhonchi.   Abdominal: Normal appearance.   Musculoskeletal: Normal range of motion. She exhibits no edema or deformity.   Neurological: She is alert and oriented to person, place, and time. She exhibits normal muscle tone. Coordination normal.   Skin: Skin is warm, dry, intact, not diaphoretic and not pale.   Psychiatric: She has a normal mood and affect. Her speech is normal and behavior is normal. Judgment and thought content normal. Cognition and memory are normal.   Nursing note and vitals reviewed.        Assessment:       1. Viral URI    2. Sinusitis, unspecified chronicity, unspecified location    3. Sinus congestion        Plan:         Viral URI  -     methylPREDNISolone (MEDROL DOSEPACK) 4 mg tablet; use as directed on the box  Dispense: 1 Package; Refill: 0  -     benzonatate (TESSALON) 200 MG capsule; Take 1 capsule (200 mg total) by mouth 3 (three) times daily as  needed for Cough.  Dispense: 60 capsule; Refill: 1  -     promethazine-dextromethorphan (PROMETHAZINE-DM) 6.25-15 mg/5 mL Syrp; Take 5 mLs by mouth nightly as needed.  Dispense: 180 mL; Refill: 1  -     fluticasone propionate (FLONASE) 50 mcg/actuation nasal spray; 1 spray (50 mcg total) by Each Nostril route once daily.  Dispense: 16 g; Refill: 0  -     sodium chloride (OCEAN NASAL) 0.65 % nasal spray; 1 spray by Nasal route as needed for Congestion.  Dispense: 45 mL; Refill: 3    Sinusitis, unspecified chronicity, unspecified location  -     fluticasone propionate (FLONASE) 50 mcg/actuation nasal spray; 1 spray (50 mcg total) by Each Nostril route once daily.  Dispense: 16 g; Refill: 0    Sinus congestion    All applicable EKG, medical records, labs, imaging reviewed in Epic and discussed with patient.   Patient Instructions     Sinusitis (No Antibiotics)    The sinuses are air-filled spaces within the bones of the face. They connect to the inside of the nose. Sinusitis is an inflammation of the tissue lining the sinus cavity. Sinus inflammation can occur during a cold. It can also be due to allergies to pollens and other particles in the air. It can cause symptoms such as sinus congestion, headache, sore throat, facial swelling and fullness. It may also cause a low-grade fever. No infection is present, and no antibiotic treatment is needed.  Home care  · Drink plenty of water, hot tea, and other liquids. This may help thin mucus. It also may promote sinus drainage.  · Heat may help soothe painful areas of the face. Use a towel soaked in hot water. Or,  the shower and direct the hot spray onto your face. Using a vaporizer along with a menthol rub at night may also help.   · An expectorant containing guaifenesin may help thin the mucus and promote drainage from the sinuses.  · Over-the-counter decongestants may be used unless a similar medicine was prescribed. Nasal sprays work the fastest. Use one that  contains phenylephrine or oxymetazoline. First blow the nose gently. Then use the spray. Do not use these medicines more often than directed on the label or symptoms may get worse. You may also use tablets containing pseudoephedrine. Avoid products that combine ingredients, because side effects may be increased. Read labels. You can also ask the pharmacist for help. (NOTE: Persons with high blood pressure should not use decongestants. They can raise blood pressure.)  · Over-the-counter antihistamines may help if allergies contributed to your sinusitis.    · Use acetaminophen or ibuprofen to control pain, unless another pain medicine was prescribed. (If you have chronic liver or kidney disease or ever had a stomach ulcer, talk with your doctor before using these medicines. Aspirin should never be used in anyone under 18 years of age who is ill with a fever. It may cause severe liver damage.)  · Use nasal rinses or irrigation as instructed by your health care provider.  · Don't smoke. This can worsen symptoms.  Follow-up care  Follow up with your healthcare provider or our staff if you are not improving within the next week.  When to seek medical advice  Call your healthcare provider if any of these occur:  · Green or yellow discharge from the nose or into the throat  · Facial pain or headache becoming more severe  · Stiff neck  · Unusual drowsiness or confusion  · Swelling of the forehead or eyelids  · Vision problems, including blurred or double vision  · Fever of 100.4ºF (38ºC) or higher, or as directed by your healthcare provider  · Seizure  · Breathing problems  · Symptoms not resolving within 10 days  Date Last Reviewed: 4/13/2015 © 2000-2017 [employer]. [address] [place] [address], [place] [address]. All rights reserved. This information is not intended as a substitute for professional medical care. Always follow your healthcare professional's instructions.       If not allergic,take tylenol  (acetominophen) for fever control, chills, or body aches every 4 hours. Do not exceed 4000 mg/ day.If not allergic, take Motrin (Ibuprofen) every 4 hours for fever, chills, pain or inflammation. Do not exceed 2400 mg/day. You can alternate taking tylenol and motrin.    If you were prescribed a narcotic medication, do not drive or operate heavy equipment or machinery while taking these medications.  You must understand that you've received an Urgent Care treatment only and that you may be released before all your medical problems are known or treated. You, the patient, will arrange for follow up care as instructed.  Follow up with your PCP or specialty clinic as directed in the next 1-2 weeks if not improved or as needed.  You can call [phone] to schedule an appointment with the appropriate provider.  If your condition worsens we recommend that you receive another evaluation at the emergency room immediately or contact your primary medical clinics after hours call service to discuss your concerns.    Please return here or go to the Emergency Department for any concerns or worsening of condition.    If you have been referred to another provider and wish to call to check on the status of your referral, please call [facility] Scheduling at [phone]

## 2020-01-28 NOTE — PATIENT INSTRUCTIONS
Sinusitis (No Antibiotics)    The sinuses are air-filled spaces within the bones of the face. They connect to the inside of the nose. Sinusitis is an inflammation of the tissue lining the sinus cavity. Sinus inflammation can occur during a cold. It can also be due to allergies to pollens and other particles in the air. It can cause symptoms such as sinus congestion, headache, sore throat, facial swelling and fullness. It may also cause a low-grade fever. No infection is present, and no antibiotic treatment is needed.  Home care  · Drink plenty of water, hot tea, and other liquids. This may help thin mucus. It also may promote sinus drainage.  · Heat may help soothe painful areas of the face. Use a towel soaked in hot water. Or,  the shower and direct the hot spray onto your face. Using a vaporizer along with a menthol rub at night may also help.   · An expectorant containing guaifenesin may help thin the mucus and promote drainage from the sinuses.  · Over-the-counter decongestants may be used unless a similar medicine was prescribed. Nasal sprays work the fastest. Use one that contains phenylephrine or oxymetazoline. First blow the nose gently. Then use the spray. Do not use these medicines more often than directed on the label or symptoms may get worse. You may also use tablets containing pseudoephedrine. Avoid products that combine ingredients, because side effects may be increased. Read labels. You can also ask the pharmacist for help. (NOTE: Persons with high blood pressure should not use decongestants. They can raise blood pressure.)  · Over-the-counter antihistamines may help if allergies contributed to your sinusitis.    · Use acetaminophen or ibuprofen to control pain, unless another pain medicine was prescribed. (If you have chronic liver or kidney disease or ever had a stomach ulcer, talk with your doctor before using these medicines. Aspirin should never be used in anyone under 18 years of age  who is ill with a fever. It may cause severe liver damage.)  · Use nasal rinses or irrigation as instructed by your health care provider.  · Don't smoke. This can worsen symptoms.  Follow-up care  Follow up with your healthcare provider or our staff if you are not improving within the next week.  When to seek medical advice  Call your healthcare provider if any of these occur:  · Green or yellow discharge from the nose or into the throat  · Facial pain or headache becoming more severe  · Stiff neck  · Unusual drowsiness or confusion  · Swelling of the forehead or eyelids  · Vision problems, including blurred or double vision  · Fever of 100.4ºF (38ºC) or higher, or as directed by your healthcare provider  · Seizure  · Breathing problems  · Symptoms not resolving within 10 days  Date Last Reviewed: 4/13/2015  © 8315-8746 [employer]. [address] [place] [address]. All rights reserved. This information is not intended as a substitute for professional medical care. Always follow your healthcare professional's instructions.       If not allergic,take tylenol (acetominophen) for fever control, chills, or body aches every 4 hours. Do not exceed 4000 mg/ day.If not allergic, take Motrin (Ibuprofen) every 4 hours for fever, chills, pain or inflammation. Do not exceed 2400 mg/day. You can alternate taking tylenol and motrin.    If you were prescribed a narcotic medication, do not drive or operate heavy equipment or machinery while taking these medications.  You must understand that you've received an Urgent Care treatment only and that you may be released before all your medical problems are known or treated. You, the patient, will arrange for follow up care as instructed.  Follow up with your PCP or specialty clinic as directed in the next 1-2 weeks if not improved or as needed.  You can call [phone] to schedule an appointment with the appropriate provider.  If your condition worsens we  recommend that you receive another evaluation at the emergency room immediately or contact your primary medical clinics after hours call service to discuss your concerns.    Please return here or go to the Emergency Department for any concerns or worsening of condition.    If you have been referred to another provider and wish to call to check on the status of your referral, please call [facility] Scheduling at [phone]

## 2020-01-31 ENCOUNTER — TELEPHONE (OUTPATIENT)
Dept: URGENT CARE | Facility: CLINIC | Age: 40
End: 2020-01-31

## 2020-02-18 ENCOUNTER — OFFICE VISIT (OUTPATIENT)
Dept: FAMILY MEDICINE | Facility: CLINIC | Age: 40
End: 2020-02-18
Payer: COMMERCIAL

## 2020-02-18 VITALS
OXYGEN SATURATION: 99 % | WEIGHT: 135.56 LBS | TEMPERATURE: 99 F | HEIGHT: 64 IN | RESPIRATION RATE: 16 BRPM | BODY MASS INDEX: 23.14 KG/M2 | SYSTOLIC BLOOD PRESSURE: 118 MMHG | HEART RATE: 85 BPM | DIASTOLIC BLOOD PRESSURE: 50 MMHG

## 2020-02-18 DIAGNOSIS — J32.4 CHRONIC PANSINUSITIS: Primary | ICD-10-CM

## 2020-02-18 PROCEDURE — 99213 OFFICE O/P EST LOW 20 MIN: CPT | Mod: S$GLB,,, | Performed by: INTERNAL MEDICINE

## 2020-02-18 PROCEDURE — 99999 PR PBB SHADOW E&M-EST. PATIENT-LVL III: ICD-10-PCS | Mod: PBBFAC,,, | Performed by: INTERNAL MEDICINE

## 2020-02-18 PROCEDURE — 3008F PR BODY MASS INDEX (BMI) DOCUMENTED: ICD-10-PCS | Mod: CPTII,S$GLB,, | Performed by: INTERNAL MEDICINE

## 2020-02-18 PROCEDURE — 99213 PR OFFICE/OUTPT VISIT, EST, LEVL III, 20-29 MIN: ICD-10-PCS | Mod: S$GLB,,, | Performed by: INTERNAL MEDICINE

## 2020-02-18 PROCEDURE — 99999 PR PBB SHADOW E&M-EST. PATIENT-LVL III: CPT | Mod: PBBFAC,,, | Performed by: INTERNAL MEDICINE

## 2020-02-18 PROCEDURE — 3008F BODY MASS INDEX DOCD: CPT | Mod: CPTII,S$GLB,, | Performed by: INTERNAL MEDICINE

## 2020-02-18 RX ORDER — METHYLPREDNISOLONE 4 MG/1
TABLET ORAL
Qty: 1 PACKAGE | Refills: 0 | Status: SHIPPED | OUTPATIENT
Start: 2020-02-18 | End: 2020-03-10

## 2020-02-18 NOTE — PROGRESS NOTES
Assessment and Plan:    1. Chronic pansinusitis  - methylPREDNISolone (MEDROL DOSEPACK) 4 mg tablet; use as directed  Dispense: 1 Package; Refill: 0      Patient presenting with more than a month of sinus congestion without any features concerning for acute bacterial sinusitis. No signs or symptoms of bacterial superinfection to suggest need for antibiotics. Discussed the reason for not prescribing antibiotics at this time and discussed calling us if she develops any concerning symptoms. I think her symptoms are more from chronic sinusitis, likely allergic. Discussed symptomatic management with saline rinses and nasal flonase. Medrol temporarily to improve symptoms but we discussed they are likely to return if she does not also start nasal steroids.    ______________________________________________________________________  Subjective:    Chief Complaint:  Upper respiratory symptoms    HPI:  Vandana is a 39 y.o. year old female here to discuss URI symptoms.     She reports that symptoms first started almost a month ago. Notes that it was worse when it first started. At this point the main symptom she has been having is congestion. Has also been having some post-nasal drip. She has had some cough related to post-nasal drip. Not really having cough outside of from post-nasal drip.  She denies having any shortness of breath, wheezing, fever, chills, ear pain and ear pressure.      Medications:  Current Outpatient Medications on File Prior to Visit   Medication Sig Dispense Refill    escitalopram oxalate (LEXAPRO) 20 MG tablet TAKE 1 TABLET BY MOUTH ONCE DAILY 30 tablet 4    fexofenadine (ALLEGRA) 60 MG tablet Take 60 mg by mouth once daily.      MV-MN/IRON FUM/FA/OMEGA3,6,9#3 (WOMEN'S MULTI ORAL) Take by mouth once daily.       [DISCONTINUED] fluticasone propionate (FLONASE) 50 mcg/actuation nasal spray 1 spray (50 mcg total) by Each Nostril route once daily. (Patient not taking: Reported on 2/18/2020) 16 g 0     "[DISCONTINUED] methylPREDNISolone (MEDROL DOSEPACK) 4 mg tablet use as directed on the box 1 Package 0    [DISCONTINUED] sodium chloride (OCEAN NASAL) 0.65 % nasal spray 1 spray by Nasal route as needed for Congestion. (Patient not taking: Reported on 2/18/2020) 45 mL 3     No current facility-administered medications on file prior to visit.        Review of Systems:  Review of Systems   Constitutional: Negative for chills and fever.   HENT: Positive for congestion, postnasal drip, rhinorrhea and sinus pressure. Negative for sinus pain.    Respiratory: Positive for cough. Negative for shortness of breath and wheezing.    Allergic/Immunologic: Positive for environmental allergies.       Past Medical History:  Past Medical History:   Diagnosis Date    ALLERGIC RHINITIS        Objective:    Vitals:  Vitals:    02/18/20 1303   BP: (!) 118/50   Pulse: 85   Resp: 16   Temp: 99.3 °F (37.4 °C)   TempSrc: Oral   SpO2: 99%   Weight: 61.5 kg (135 lb 9.3 oz)   Height: 5' 4" (1.626 m)   PainSc:   3   PainLoc: Head       Physical Exam   Constitutional: She is oriented to person, place, and time. She appears well-developed and well-nourished. No distress.   HENT:   Right Ear: Tympanic membrane and ear canal normal.   Left Ear: Tympanic membrane and ear canal normal.   Nose: Mucosal edema and rhinorrhea (clear) present. Right sinus exhibits no maxillary sinus tenderness and no frontal sinus tenderness. Left sinus exhibits no maxillary sinus tenderness and no frontal sinus tenderness.   Mouth/Throat: Oropharynx is clear and moist. No posterior oropharyngeal edema or posterior oropharyngeal erythema. Tonsils are 0 on the right. Tonsils are 0 on the left. No tonsillar exudate.   Eyes: Conjunctivae are normal. Right eye exhibits no discharge. Left eye exhibits no discharge.   Cardiovascular: Normal rate and regular rhythm.   Pulmonary/Chest: Effort normal. No respiratory distress.   Neurological: She is alert and oriented to person, " place, and time.   Skin: Skin is warm and dry.   Psychiatric: She has a normal mood and affect. Her behavior is normal. Judgment and thought content normal.   Vitals reviewed.      Destiny Garrett MD  Internal Medicine

## 2020-02-21 ENCOUNTER — PATIENT MESSAGE (OUTPATIENT)
Dept: FAMILY MEDICINE | Facility: CLINIC | Age: 40
End: 2020-02-21

## 2020-02-21 DIAGNOSIS — Z20.828 EXPOSURE TO THE FLU: Primary | ICD-10-CM

## 2020-02-21 RX ORDER — OSELTAMIVIR PHOSPHATE 75 MG/1
75 CAPSULE ORAL DAILY
Qty: 10 CAPSULE | Refills: 0 | Status: SHIPPED | OUTPATIENT
Start: 2020-02-21 | End: 2020-03-02

## 2020-03-05 RX ORDER — ESCITALOPRAM OXALATE 20 MG/1
TABLET ORAL
Qty: 30 TABLET | Refills: 3 | Status: SHIPPED | OUTPATIENT
Start: 2020-03-05 | End: 2020-08-27

## 2020-03-13 ENCOUNTER — OFFICE VISIT (OUTPATIENT)
Dept: FAMILY MEDICINE | Facility: CLINIC | Age: 40
End: 2020-03-13
Payer: COMMERCIAL

## 2020-03-13 VITALS
BODY MASS INDEX: 23.14 KG/M2 | DIASTOLIC BLOOD PRESSURE: 60 MMHG | TEMPERATURE: 99 F | WEIGHT: 135.56 LBS | HEIGHT: 64 IN | HEART RATE: 72 BPM | SYSTOLIC BLOOD PRESSURE: 104 MMHG | OXYGEN SATURATION: 98 %

## 2020-03-13 DIAGNOSIS — J03.01 RECURRENT STREPTOCOCCAL TONSILLITIS: ICD-10-CM

## 2020-03-13 DIAGNOSIS — J02.0 STREP PHARYNGITIS: Primary | ICD-10-CM

## 2020-03-13 DIAGNOSIS — J02.9 SORE THROAT: ICD-10-CM

## 2020-03-13 LAB
CTP QC/QA: YES
S PYO RRNA THROAT QL PROBE: NEGATIVE

## 2020-03-13 PROCEDURE — 87147 CULTURE TYPE IMMUNOLOGIC: CPT

## 2020-03-13 PROCEDURE — 99999 PR PBB SHADOW E&M-EST. PATIENT-LVL III: CPT | Mod: PBBFAC,,, | Performed by: INTERNAL MEDICINE

## 2020-03-13 PROCEDURE — 87880 STREP A ASSAY W/OPTIC: CPT | Mod: QW,S$GLB,, | Performed by: INTERNAL MEDICINE

## 2020-03-13 PROCEDURE — 87880 POCT RAPID STREP A: ICD-10-PCS | Mod: QW,S$GLB,, | Performed by: INTERNAL MEDICINE

## 2020-03-13 PROCEDURE — 99214 OFFICE O/P EST MOD 30 MIN: CPT | Mod: 25,S$GLB,, | Performed by: INTERNAL MEDICINE

## 2020-03-13 PROCEDURE — 99214 PR OFFICE/OUTPT VISIT, EST, LEVL IV, 30-39 MIN: ICD-10-PCS | Mod: 25,S$GLB,, | Performed by: INTERNAL MEDICINE

## 2020-03-13 PROCEDURE — 99999 PR PBB SHADOW E&M-EST. PATIENT-LVL III: ICD-10-PCS | Mod: PBBFAC,,, | Performed by: INTERNAL MEDICINE

## 2020-03-13 PROCEDURE — 87081 CULTURE SCREEN ONLY: CPT

## 2020-03-13 PROCEDURE — 3008F BODY MASS INDEX DOCD: CPT | Mod: CPTII,S$GLB,, | Performed by: INTERNAL MEDICINE

## 2020-03-13 PROCEDURE — 3008F PR BODY MASS INDEX (BMI) DOCUMENTED: ICD-10-PCS | Mod: CPTII,S$GLB,, | Performed by: INTERNAL MEDICINE

## 2020-03-13 RX ORDER — AMOXICILLIN AND CLAVULANATE POTASSIUM 875; 125 MG/1; MG/1
1 TABLET, FILM COATED ORAL EVERY 12 HOURS
Qty: 14 TABLET | Refills: 0 | Status: SHIPPED | OUTPATIENT
Start: 2020-03-13 | End: 2020-03-20

## 2020-03-13 NOTE — Clinical Note
MARY ELLEN, you had seen her last year after strep #3, she has now had this for the 6th time. She wanted to know if this would change your opinion on whether to see her back for possible tonsillectomy. She is also still considering sinus surgery and wanted to know if these could theoretically be done together.

## 2020-03-13 NOTE — PROGRESS NOTES
Assessment and Plan:    1. Strep pharyngitis  - amoxicillin-clavulanate 875-125mg (AUGMENTIN) 875-125 mg per tablet; Take 1 tablet by mouth every 12 (twelve) hours. for 7 days  Dispense: 14 tablet; Refill: 0    2. Sore throat  - POCT Rapid Strep A  - Strep A culture, throat    3. Recurrent streptococcal tonsillitis  Patient presenting with 6th episode in 2 years of strep throat symptoms, each time rapid negative and culture positive. Had previously seen ENT after 3rd episode, at the time thought that tonsils were small enough she may not benefit from surgery. She would like to see if this may be an option now.     ______________________________________________________________________  Subjective:    Chief Complaint:  Sore throat and tender LAD    HPI:  Vandana is a 39 y.o. year old female here for evaluation of sore throat and tender cervical LAD.     Symptoms started 2 days ago with mild sore throat, but has been getting more severe. She has had associated trouble swallowing due to pain and has had tender lymph nodes in her neck. No fever. No cough. No chest tightness or difficulty breathing. She reports that this feels exactly like the strep she has had in the past.     She notably has a history of recurrent strep pharyngitis, most recently in January of this year.     Medications:  Current Outpatient Medications on File Prior to Visit   Medication Sig Dispense Refill    escitalopram oxalate (LEXAPRO) 20 MG tablet Take 1 tablet by mouth once daily 30 tablet 3    fexofenadine (ALLEGRA) 60 MG tablet Take 60 mg by mouth once daily.      MV-MN/IRON FUM/FA/OMEGA3,6,9#3 (WOMEN'S MULTI ORAL) Take by mouth once daily.        No current facility-administered medications on file prior to visit.        Review of Systems:  Review of Systems   Constitutional: Negative for chills and fever.   HENT: Positive for sore throat and trouble swallowing. Negative for congestion, sinus pressure and sinus pain.    Respiratory: Negative  "for cough, shortness of breath and wheezing.        Past Medical History:  Past Medical History:   Diagnosis Date    ALLERGIC RHINITIS        Objective:    Vitals:  Vitals:    03/13/20 1624   BP: 104/60   Pulse: 72   Temp: 98.8 °F (37.1 °C)   TempSrc: Oral   SpO2: 98%   Weight: 61.5 kg (135 lb 9.3 oz)   Height: 5' 4" (1.626 m)   PainSc:   6   PainLoc: Throat       Physical Exam   Constitutional: She is oriented to person, place, and time. She appears well-developed and well-nourished. No distress.   HENT:   Mouth/Throat: Posterior oropharyngeal erythema present. Tonsils are 1+ on the right. Tonsils are 1+ on the left. Tonsillar exudate.   Eyes: No scleral icterus.   Cardiovascular: Normal rate and regular rhythm.   Pulmonary/Chest: Effort normal and breath sounds normal. No respiratory distress. She has no wheezes.   Musculoskeletal: She exhibits no edema.   Lymphadenopathy:     She has cervical adenopathy.   Neurological: She is alert and oriented to person, place, and time.   Skin: Skin is warm and dry.   Psychiatric: She has a normal mood and affect. Her behavior is normal.   Vitals reviewed.      Data:    POCT Strep negative    Destiny Garrett MD  Internal Medicine  "

## 2020-03-16 LAB — BACTERIA THROAT CULT: ABNORMAL

## 2020-03-18 ENCOUNTER — TELEPHONE (OUTPATIENT)
Dept: OTOLARYNGOLOGY | Facility: CLINIC | Age: 40
End: 2020-03-18

## 2020-03-18 NOTE — TELEPHONE ENCOUNTER
----- Message from Timmy Vallejo MD sent at 3/17/2020  4:54 PM CDT -----  Yes,we will see if she wants to set up a virtual visit or fu with me in 6 weeks.  Thanks Destiny  ----- Message -----  From: Destiny Garrett MD  Sent: 3/13/2020   5:13 PM CDT  To: Timmy Vallejo MD    FYI, you had seen her last year after strep #3, she has now had this for the 6th time. She wanted to know if this would change your opinion on whether to see her back for possible tonsillectomy. She is also still considering sinus surgery and wanted to know if these could theoretically be done together.

## 2020-03-19 ENCOUNTER — OFFICE VISIT (OUTPATIENT)
Dept: OTOLARYNGOLOGY | Facility: CLINIC | Age: 40
End: 2020-03-19
Payer: COMMERCIAL

## 2020-03-19 DIAGNOSIS — J32.4 CHRONIC PANSINUSITIS: Primary | ICD-10-CM

## 2020-03-19 DIAGNOSIS — J33.9 NASAL POLYPOSIS: ICD-10-CM

## 2020-03-19 DIAGNOSIS — J03.91 RECURRENT TONSILLITIS: ICD-10-CM

## 2020-03-19 DIAGNOSIS — J34.2 NASAL SEPTAL DEVIATION: ICD-10-CM

## 2020-03-19 PROCEDURE — 99213 OFFICE O/P EST LOW 20 MIN: CPT | Mod: 95,,, | Performed by: OTOLARYNGOLOGY

## 2020-03-19 PROCEDURE — 99213 PR OFFICE/OUTPT VISIT, EST, LEVL III, 20-29 MIN: ICD-10-PCS | Mod: 95,,, | Performed by: OTOLARYNGOLOGY

## 2020-03-19 NOTE — PROGRESS NOTES
The patient location is: home  The chief complaint leading to consultation is: sinus issues and strep throat  Visit type: Virtual visit with synchronous audio and video  Each patient to whom he or she provides medical services by telemedicine is:  (1) informed of the relationship between the physician and patient and the respective role of any other health care provider with respect to management of the patient; and (2) notified that he or she may decline to receive medical services by telemedicine and may withdraw from such care at any time.    HPI:    Vandana Cobos is a 39 y.o. female here as a follow-up for her chronic pansinusitis with nasal polyposis and recurrent strep tonsillitis..   She has had three episodes of strep since the last visit with me. Total of 7 in the past year.  She does pass it on on to her children when she has it. Begins as severe sore throat and distinct from her URI.   Regarding her nasal issues: she has had a few more flairs in nasal issues with congestion, decr smell. Did help a lot with medrol pack. She has been using her flonase more regularly.      Current Outpatient Medications:     amoxicillin-clavulanate 875-125mg (AUGMENTIN) 875-125 mg per tablet, Take 1 tablet by mouth every 12 (twelve) hours. for 7 days, Disp: 14 tablet, Rfl: 0    escitalopram oxalate (LEXAPRO) 20 MG tablet, Take 1 tablet by mouth once daily, Disp: 30 tablet, Rfl: 3    fexofenadine (ALLEGRA) 60 MG tablet, Take 60 mg by mouth once daily., Disp: , Rfl:     MV-MN/IRON FUM/FA/OMEGA3,6,9#3 (WOMEN'S MULTI ORAL), Take by mouth once daily. , Disp: , Rfl:     Review of patient's allergies indicates:  No Known Allergies    Review of Systems:  Constitutional: no fevers, weight loss  Respiratory: no shortness of breath, wheezing  Hematology: no bleeding or bruising    Physical Exam:  Normocephalic and atraumatic  Pleasant, talkative  Neurologically intact, oriented, appropriate speech  Unlabored  respiration    A/P:    1. Chronic pansinusitis    2. Nasal polyposis    3. Nasal septal deviation    4. Recurrent tonsillitis         1. We discussed options for continue strep tonsillitis and chronic sinusitis with polyposis. We discussed tonsillectomy +/- adenoidectomy, bilateral max and ant ethmoid, possible septum  2. I discussed the risks of tonsillectomy/adenoidectomy and sinus surgery.  3. She will think about her options and call me in 2 months to touch base and decide on a plan.      Total time spent with patient/caregiver: 19 (over 50% was spent counseling)

## 2020-03-19 NOTE — PATIENT INSTRUCTIONS
Continue the Flonase  Let me know if congestion gets worse and I can send you another round of Medrol steroid.   Touch base with me in 2 months to discuss plan moving forward      Understanding Tonsillectomy and Adenoidectomy    Tonsils and adenoids are clusters of tissues in the back of the throat. These tissues form part of the bodys immune system, which helps the body fight disease. If these structures repeatedly become infected or become enlarged, they can lead to problems. They may then be removed with surgery. Surgery to remove the tonsils is called tonsillectomy. In some cases, the adenoids are also removed. This is called adenoidectomy.  Why tonsillectomy and adenoidectomy are done  You may have your tonsils, adenoids, or both removed for reasons that include:  · Infection of the tonsils (tonsillitis) that keeps coming back  · Repeated infections of the throat  · Enlargement of the tonsils or adenoids that affects breathing during sleep. This causes a condition called obstructive sleep apnea.  · Suspected cancer of the throat  Tonsillectomy can remove part or all of the tonsils.  How tonsillectomy and adenoidectomy are done  This surgery is done in a hospital or surgery center. It usually takes less than 1 hour.  · An IV line is inserted in a vein in your arm or hand. This gives you fluids and medicines.  · You are given general anesthesia to put you into a deep sleep through the procedure.  · A special device is used to hold your mouth open. A tube is put down into your throat to help keep your airway open during the procedure.  · The doctor uses surgical tools to remove the tonsils and possibly the adenoids.  · The doctor removes all of the tools.  · You are sent home when you are awake and recovered from the anesthesia.  Risks of tonsillectomy and adenoidectomy  Risks include:  · Bleeding  · Electric burns of the mouth and lip  · Infection  · Injury to the lips or teeth  · Numbness of the  tongue  · Risks of anesthesia  · The need for a second surgery  · Voice changes  Date Last Reviewed: 6/1/2016  © 2467-0282 [employer]. [address] [place] [address], [place] [address]. All rights reserved. This information is not intended as a substitute for professional medical care. Always follow your healthcare professional's instructions.      Endoscopic Sinus Surgery  The sinuses are hollow areas formed by the bones of the face. Normally, a thin layer of mucus drains from the sinuses into the nose. If the drainage path is blocked, problems such as infection can result. Endoscopic sinus surgery can be done to help clear blockages. The surgeon uses a thin, lighted tube (endoscope) that is put into your nose. The tube lets the doctor see and operate inside your nose and sinuses.     Straightening the septum       Removing polyps         Opening the ethmoid sinuses       Clearing the outflow pathway      Straightening the septum  The septum is a piece of cartilage and bone that runs straight down the inside of the nose. It divides the nose into two sides.  A deviated septum is crooked instead of straight. A crooked septum can cause breathing problems. To fix a deviated septum, the doctor reshapes or trims the cartilage and bone. There is enough septum left for the nose to hold its shape. But the air has more space to move in and out of the nose. This improves your breathing.  Removing polyps  Polyps are small growths. They can grow in both the nose and sinuses. The surgeon may use different methods to remove them. Often, the surgeon uses special tools to remove polyps without harming nearby tissues.  Opening the ethmoid sinuses  The ethmoid sinuses are made up of many small air spaces, like a honeycomb. Like the other sinuses, the ethmoids have a lining that makes mucus. In some cases the drainage path is blocked. The doctor may open the thin walls of bone that separate the air spaces. This creates a  passage for mucus to drain more easily.  Clearing the major outflow pathway of the sinuses  The osteomeatal complex is a term for a major outflow tract of your sinuses. Similar to a traffic jam, when this area becomes blocked, you may get symptoms in your maxillary, ethmoid, and frontal sinuses. Opening this area is a primary step in most sinus surgeries. The uncinate process is a small piece of bone and tissue in the sinuses. It forms an outlet for part of the sinuses. If this tissue is swollen (inflamed), it will block drainage of mucus. The doctor may remove the uncinate process so that mucus can drain.  Date Last Reviewed: 10/1/2016  © 9120-7888 [employer]. [address] [place] [address], [place] [address]. All rights reserved. This information is not intended as a substitute for professional medical care. Always follow your healthcare professional's instructions.

## 2020-04-27 ENCOUNTER — PATIENT MESSAGE (OUTPATIENT)
Dept: FAMILY MEDICINE | Facility: CLINIC | Age: 40
End: 2020-04-27

## 2020-05-11 ENCOUNTER — PATIENT MESSAGE (OUTPATIENT)
Dept: OPTOMETRY | Facility: CLINIC | Age: 40
End: 2020-05-11

## 2020-05-22 ENCOUNTER — TELEPHONE (OUTPATIENT)
Dept: OPTOMETRY | Facility: CLINIC | Age: 40
End: 2020-05-22

## 2020-05-22 NOTE — TELEPHONE ENCOUNTER
----- Message from JIGNESH Lopez OD sent at 5/22/2020 10:24 AM CDT -----  All set    ----- Message -----  From: Deborah Colon  Sent: 5/22/2020   8:52 AM CDT  To: JIGNESH Lopez OD    Good morning, can you extend this patients contact prescription to July when she has an exam scheduled?

## 2020-06-30 ENCOUNTER — OFFICE VISIT (OUTPATIENT)
Dept: FAMILY MEDICINE | Facility: CLINIC | Age: 40
End: 2020-06-30
Payer: COMMERCIAL

## 2020-06-30 VITALS
TEMPERATURE: 98 F | HEART RATE: 74 BPM | SYSTOLIC BLOOD PRESSURE: 104 MMHG | DIASTOLIC BLOOD PRESSURE: 62 MMHG | RESPIRATION RATE: 18 BRPM | HEIGHT: 64 IN | WEIGHT: 135.81 LBS | BODY MASS INDEX: 23.18 KG/M2

## 2020-06-30 DIAGNOSIS — R07.89 ATYPICAL CHEST PAIN: Primary | ICD-10-CM

## 2020-06-30 PROCEDURE — 99999 PR PBB SHADOW E&M-EST. PATIENT-LVL III: ICD-10-PCS | Mod: PBBFAC,,, | Performed by: FAMILY MEDICINE

## 2020-06-30 PROCEDURE — 3008F BODY MASS INDEX DOCD: CPT | Mod: CPTII,S$GLB,, | Performed by: FAMILY MEDICINE

## 2020-06-30 PROCEDURE — 93005 ELECTROCARDIOGRAM TRACING: CPT | Mod: S$GLB,,, | Performed by: FAMILY MEDICINE

## 2020-06-30 PROCEDURE — 93010 EKG 12-LEAD: ICD-10-PCS | Mod: S$GLB,,, | Performed by: INTERNAL MEDICINE

## 2020-06-30 PROCEDURE — 99214 PR OFFICE/OUTPT VISIT, EST, LEVL IV, 30-39 MIN: ICD-10-PCS | Mod: S$GLB,,, | Performed by: FAMILY MEDICINE

## 2020-06-30 PROCEDURE — 99214 OFFICE O/P EST MOD 30 MIN: CPT | Mod: S$GLB,,, | Performed by: FAMILY MEDICINE

## 2020-06-30 PROCEDURE — 93005 EKG 12-LEAD: ICD-10-PCS | Mod: S$GLB,,, | Performed by: FAMILY MEDICINE

## 2020-06-30 PROCEDURE — 93010 ELECTROCARDIOGRAM REPORT: CPT | Mod: S$GLB,,, | Performed by: INTERNAL MEDICINE

## 2020-06-30 PROCEDURE — 3008F PR BODY MASS INDEX (BMI) DOCUMENTED: ICD-10-PCS | Mod: CPTII,S$GLB,, | Performed by: FAMILY MEDICINE

## 2020-06-30 PROCEDURE — 99999 PR PBB SHADOW E&M-EST. PATIENT-LVL III: CPT | Mod: PBBFAC,,, | Performed by: FAMILY MEDICINE

## 2020-06-30 RX ORDER — MELOXICAM 15 MG/1
15 TABLET ORAL DAILY
Qty: 7 TABLET | Refills: 0 | Status: SHIPPED | OUTPATIENT
Start: 2020-06-30 | End: 2020-10-16

## 2020-06-30 NOTE — PROGRESS NOTES
" THIS DOCUMENT WAS MADE IN PART WITH VOICE RECOGNITION SOFTWARE.  OCCASIONALLY THIS SOFTWARE WILL MISINTERPRET WORDS OR PHRASES.      Vandana Cobos  1980    Vandana was seen today for chest pain and arm pain.    Diagnoses and all orders for this visit:    Atypical chest pain  -     EKG 12-lead; Future  -     EKG 12-lead    Other orders  -     meloxicam (MOBIC) 15 MG tablet; Take 1 tablet (15 mg total) by mouth once daily.    Suspect musculoskeletal.  Will see if we can calm this down with an anti-inflammatory.  If not improving, consider additional evaluation or referral to physical medicine.  If there is worsening re-evaluate but no reproducible deficits on exam today so to start conservatively.  Also note that specifically she was concerned because she read that symptoms could be compatible with MS.  Right now not seeing anything that would alarm me for this however if symptoms fail to improve or worsen or change then additional evaluation may be necessary.  She is not having any musculoskeletal symptoms elsewhere.  She does state that occasionally she sees a bright spot in her left eye/field of vision.  Started a small bright spot gradually get bigger and goes away after a few minutes.  No pain no headache.  Previously suspected maybe an ocular migraine but I did advise her to make certain she checks with her eye doctor and she states she has an appointment scheduled.  If anything progresses consider MR    Subjective     Chief Complaint   Patient presents with    Chest Pain     1  week     Arm Pain     1 week       HPI    Discomfort right upper chest, accompanied buy tingling right upper arm.  The discomfort in the chest is described as a contraction."  Tightness and squeezing sensation that lasts about 30 sec and then eases off.  During this time she will experience some tingling in the right upper arm.  No injury, no specific activity triggers this.  It can occur while still, can occur while doing " something with her arms.  Does not occur with physical exertion.  It may have been a few times a day  No shortness of breath, no dizziness, or lightheaded  No rash  No fever or chills.  No neck pain although a few weeks ago she might have had some mild stiffness in the neck.        Active Ambulatory Problems     Diagnosis Date Noted    Ovarian cyst, left 10/11/2012    Anemia 11/07/2012    Glaucoma, suspect - Both Eyes 08/29/2013    Breast mass, right 09/04/2013     Resolved Ambulatory Problems     Diagnosis Date Noted    Normal pregnancy, first 10/11/2012    UTI (lower urinary tract infection)-lacto first trimester  11/07/2012    Large for gestational age- (7lbs 8 oz on usg 5/22) 04/26/2013    GBS (group B Streptococcus carrier), +RV culture, currently pregnant 05/15/2013     Past Medical History:   Diagnosis Date    ALLERGIC RHINITIS          Review of Systems   Constitutional: Negative for activity change, fatigue, fever and unexpected weight change.   HENT: Negative.    Eyes: Negative for visual disturbance.   Respiratory: Positive for chest tightness. Negative for cough, shortness of breath and wheezing.    Cardiovascular: Negative for chest pain and leg swelling.   Gastrointestinal: Negative.    Endocrine: Negative.    Genitourinary: Negative.    Musculoskeletal: Negative for neck pain and neck stiffness.   Skin: Negative.  Negative for rash.   Neurological:        Tingling sensation right upper arm.  No weakness       Objective     Physical Exam  Vitals signs reviewed.   Constitutional:       Appearance: She is well-developed. She is not diaphoretic.   HENT:      Head: Normocephalic and atraumatic.      Right Ear: Tympanic membrane, ear canal and external ear normal.      Left Ear: Tympanic membrane, ear canal and external ear normal.   Eyes:      General: No scleral icterus.     Conjunctiva/sclera: Conjunctivae normal.   Neck:      Musculoskeletal: Normal range of motion and neck supple. No neck  "rigidity or muscular tenderness.   Cardiovascular:      Rate and Rhythm: Normal rate and regular rhythm.      Heart sounds: Normal heart sounds. No murmur. No friction rub. No gallop.    Pulmonary:      Effort: Pulmonary effort is normal. No respiratory distress.      Breath sounds: Normal breath sounds.   Musculoskeletal:      Comments: Full range of motion in both shoulders.  Full range of motion in the cervical spine.  She has normal strength in the shoulders and upper body.  I am unable to reproduce any of her symptoms today.  She has normal muscle strength throughout   Lymphadenopathy:      Cervical: No cervical adenopathy.   Skin:     General: Skin is dry.      Findings: No rash.   Neurological:      Mental Status: She is alert and oriented to person, place, and time.      Cranial Nerves: No cranial nerve deficit.      Motor: No weakness.      Coordination: Coordination normal.      Gait: Gait normal.      Deep Tendon Reflexes: Reflexes normal.   Psychiatric:         Behavior: Behavior normal.       Vitals:    06/30/20 0853   BP: 104/62   BP Location: Left arm   Patient Position: Sitting   BP Method: Medium (Manual)   Pulse: 74   Resp: 18   Temp: 98.4 °F (36.9 °C)   TempSrc: Temporal   Weight: 61.6 kg (135 lb 12.9 oz)   Height: 5' 4" (1.626 m)       MOST RECENT LABS IN OUR ELECTRONIC MEDICAL RECORD:     Results for orders placed or performed in visit on 03/13/20   Strep A culture, throat    Specimen: Throat   Result Value Ref Range    Strep A Culture (A)      STREPTOCOCCUS PYOGENES (GROUP A)  Beta-hemolytic streptococci are routinely susceptible to   penicillins,cephalosporins and carbapenems.     POCT Rapid Strep A   Result Value Ref Range    Rapid Strep A Screen Negative Negative     Acceptable Yes            "

## 2020-07-16 ENCOUNTER — OFFICE VISIT (OUTPATIENT)
Dept: OPTOMETRY | Facility: CLINIC | Age: 40
End: 2020-07-16
Payer: COMMERCIAL

## 2020-07-16 DIAGNOSIS — H40.013 OAG (OPEN ANGLE GLAUCOMA) SUSPECT, LOW RISK, BILATERAL: Primary | ICD-10-CM

## 2020-07-16 DIAGNOSIS — Z46.0 FITTING AND ADJUSTMENT OF SPECTACLES AND CONTACT LENSES: Primary | ICD-10-CM

## 2020-07-16 DIAGNOSIS — H52.13 MYOPIA OF BOTH EYES: ICD-10-CM

## 2020-07-16 PROCEDURE — 92310 PR CONTACT LENS FITTING (NO CHANGE): ICD-10-PCS | Mod: CSM,S$GLB,, | Performed by: OPTOMETRIST

## 2020-07-16 PROCEDURE — 92014 COMPRE OPH EXAM EST PT 1/>: CPT | Mod: S$GLB,,, | Performed by: OPTOMETRIST

## 2020-07-16 PROCEDURE — 99999 PR PBB SHADOW E&M-EST. PATIENT-LVL II: CPT | Mod: PBBFAC,,, | Performed by: OPTOMETRIST

## 2020-07-16 PROCEDURE — 99999 PR PBB SHADOW E&M-EST. PATIENT-LVL II: ICD-10-PCS | Mod: PBBFAC,,, | Performed by: OPTOMETRIST

## 2020-07-16 PROCEDURE — 92310 CONTACT LENS FITTING OU: CPT | Mod: CSM,S$GLB,, | Performed by: OPTOMETRIST

## 2020-07-16 PROCEDURE — 92133 CPTRZD OPH DX IMG PST SGM ON: CPT | Mod: S$GLB,,, | Performed by: OPTOMETRIST

## 2020-07-16 PROCEDURE — 92133 POSTERIOR SEGMENT OCT OPTIC NERVE(OCULAR COHERENCE TOMOGRAPHY) - OU - BOTH EYES: ICD-10-PCS | Mod: S$GLB,,, | Performed by: OPTOMETRIST

## 2020-07-16 PROCEDURE — 92014 PR EYE EXAM, EST PATIENT,COMPREHESV: ICD-10-PCS | Mod: S$GLB,,, | Performed by: OPTOMETRIST

## 2020-07-16 NOTE — PROGRESS NOTES
HPI     DLE- 12/06/17     Pt is here for routine eye exam with CL fit. Pt states, vba is doing well.   Denies eye pain. Denies flashes or floaters. No gtts. Pt is happy with fit   and feel of CL.  Denies sleeping in lenses. Currently wears Biofinity. Hx   with ocular migraines     Last edited by Jesse Nascimento on 7/16/2020  8:57 AM. (History)            Assessment /Plan     For exam results, see Encounter Report.    OAG (open angle glaucoma) suspect, low risk, bilateral    Myopia of both eyes - Both Eyes      1. IOP low and fine for nerve, OCT normal, pachy thin, Prev HVF normal. High risk suspect. Fam history, cupping and thin pachy. Still No treat at this time. Prev Gonio open,.  RTC yearly with HVF.   2. New Spec Rx given and Contact lens Rx released to pt. Daily wear only advised, with education to risks of extended wear.  Discussed lens care, compliance and solutions. RTC yearly contact lens follow up.   . Different lens options discussed with patient. RTC 1 year full exam.

## 2020-10-13 LAB
HPV APTIMA: NEGATIVE
PAP SMEAR: NORMAL

## 2020-10-16 ENCOUNTER — OFFICE VISIT (OUTPATIENT)
Dept: URGENT CARE | Facility: CLINIC | Age: 40
End: 2020-10-16
Payer: COMMERCIAL

## 2020-10-16 VITALS
WEIGHT: 135 LBS | HEIGHT: 64 IN | RESPIRATION RATE: 18 BRPM | TEMPERATURE: 98 F | HEART RATE: 77 BPM | DIASTOLIC BLOOD PRESSURE: 58 MMHG | OXYGEN SATURATION: 99 % | BODY MASS INDEX: 23.05 KG/M2 | SYSTOLIC BLOOD PRESSURE: 98 MMHG

## 2020-10-16 DIAGNOSIS — R05.9 COUGH: ICD-10-CM

## 2020-10-16 DIAGNOSIS — J06.9 VIRAL URI WITH COUGH: Primary | ICD-10-CM

## 2020-10-16 LAB
CTP QC/QA: YES
SARS-COV-2 RDRP RESP QL NAA+PROBE: NEGATIVE

## 2020-10-16 PROCEDURE — 99213 PR OFFICE/OUTPT VISIT, EST, LEVL III, 20-29 MIN: ICD-10-PCS | Mod: S$GLB,CS,, | Performed by: PHYSICIAN ASSISTANT

## 2020-10-16 PROCEDURE — 99213 OFFICE O/P EST LOW 20 MIN: CPT | Mod: S$GLB,CS,, | Performed by: PHYSICIAN ASSISTANT

## 2020-10-16 PROCEDURE — U0002: ICD-10-PCS | Mod: QW,S$GLB,, | Performed by: PHYSICIAN ASSISTANT

## 2020-10-16 PROCEDURE — U0002 COVID-19 LAB TEST NON-CDC: HCPCS | Mod: QW,S$GLB,, | Performed by: PHYSICIAN ASSISTANT

## 2020-10-16 RX ORDER — FLUTICASONE PROPIONATE 50 MCG
1 SPRAY, SUSPENSION (ML) NASAL DAILY
COMMUNITY

## 2020-10-16 NOTE — PROGRESS NOTES
"Subjective:       Patient ID: Vandana Cobos is a 40 y.o. female.    Vitals:  height is 5' 4" (1.626 m) and weight is 61.2 kg (135 lb). Her temperature is 97.5 °F (36.4 °C). Her blood pressure is 98/58 (abnormal) and her pulse is 77. Her respiration is 18 and oxygen saturation is 99%.     Chief Complaint: URI, Cough, and Headache    Pt. Presents to clinic with congestion, sore throat, and productive cough X yesterday.     URI   This is a new problem. The current episode started yesterday. The problem has been unchanged. There has been no fever. Associated symptoms include congestion, coughing, headaches and a sore throat. Pertinent negatives include no ear pain, nausea, rash, sinus pain, vomiting or wheezing. She has tried nothing for the symptoms.   Cough  Associated symptoms include chills, headaches and a sore throat. Pertinent negatives include no ear pain, eye redness, fever, hemoptysis, myalgias, rash, shortness of breath or wheezing.   Headache   Associated symptoms include coughing and a sore throat. Pertinent negatives include no ear pain, eye redness, fever, nausea, sinus pressure or vomiting.       Constitution: Positive for chills. Negative for sweating and fever.   HENT: Positive for congestion and sore throat. Negative for ear pain, sinus pain, sinus pressure and voice change.    Neck: Negative for painful lymph nodes.   Eyes: Negative for eye redness.   Respiratory: Positive for cough and sputum production. Negative for chest tightness, bloody sputum, COPD, shortness of breath, stridor, wheezing and asthma.    Gastrointestinal: Negative for nausea and vomiting.   Musculoskeletal: Negative for muscle ache.   Skin: Negative for rash.   Allergic/Immunologic: Negative for seasonal allergies and asthma.   Neurological: Positive for headaches.   Hematologic/Lymphatic: Negative for swollen lymph nodes.       Objective:      Physical Exam   Constitutional: She is oriented to person, place, and time. She " appears well-developed. She is cooperative.  Non-toxic appearance. She does not appear ill. No distress.   HENT:   Head: Normocephalic and atraumatic.   Ears:   Right Ear: Hearing and external ear normal.   Left Ear: Hearing and external ear normal.   Nose: Nose normal. No mucosal edema, rhinorrhea or nasal deformity. No epistaxis. Right sinus exhibits no maxillary sinus tenderness and no frontal sinus tenderness. Left sinus exhibits no maxillary sinus tenderness and no frontal sinus tenderness.   Mouth/Throat: Uvula is midline, oropharynx is clear and moist and mucous membranes are normal. No trismus in the jaw. Normal dentition. No uvula swelling. No oropharyngeal exudate, posterior oropharyngeal edema or posterior oropharyngeal erythema.   Eyes: Conjunctivae and lids are normal. Right eye exhibits no discharge. Left eye exhibits no discharge. No scleral icterus.   Neck: Trachea normal, full passive range of motion without pain and phonation normal. Neck supple. No neck rigidity. No edema and no erythema present.   Cardiovascular: Normal rate, regular rhythm, normal heart sounds and normal pulses.   Pulmonary/Chest: Effort normal and breath sounds normal. No respiratory distress. She has no decreased breath sounds. She has no wheezes. She has no rhonchi. She has no rales.   Abdominal: Normal appearance.   Musculoskeletal: Normal range of motion.         General: No deformity.   Neurological: She is alert and oriented to person, place, and time. She exhibits normal muscle tone. Coordination normal.   Skin: Skin is warm, dry, intact, not diaphoretic and not pale. Psychiatric: Her speech is normal and behavior is normal. Mood, judgment and thought content normal.   Nursing note and vitals reviewed.        Assessment:       1. Viral URI with cough    2. Cough        Plan:         Viral URI with cough    Cough  -     POCT COVID-19 Rapid Screening      Results for orders placed or performed in visit on 10/16/20   POCT  COVID-19 Rapid Screening   Result Value Ref Range    POC Rapid COVID Negative Negative     Acceptable Yes         Patient Instructions     Viral Upper Respiratory Illness (Adult)  You have a viral upper respiratory illness (URI), which is another term for the common cold. This illness is contagious during the first few days. It is spread through the air by coughing and sneezing. It may also be spread by direct contact (touching the sick person and then touching your own eyes, nose, or mouth). Frequent handwashing will decrease risk of spread. Most viral illnesses go away within 7 to 10 days with rest and simple home remedies. Sometimes the illness may last for several weeks. Antibiotics will not kill a virus, and they are generally not prescribed for this condition.    Home care  · If symptoms are severe, rest at home for the first 2 to 3 days. When you resume activity, don't let yourself get too tired.  · Avoid being exposed to cigarette smoke (yours or others).  · You may use acetaminophen or ibuprofen to control pain and fever, unless another medicine was prescribed. (Note: If you have chronic liver or kidney disease, have ever had a stomach ulcer or gastrointestinal bleeding, or are taking blood-thinning medicines, talk with your healthcare provider before using these medicines.) Aspirin should never be given to anyone under 18 years of age who is ill with a viral infection or fever. It may cause severe liver or brain damage.  · Your appetite may be poor, so a light diet is fine. Avoid dehydration by drinking 6 to 8 glasses of fluids per day (water, soft drinks, juices, tea, or soup). Extra fluids will help loosen secretions in the nose and lungs.  · Over-the-counter cold medicines will not shorten the length of time youre sick, but they may be helpful for the following symptoms: cough, sore throat, and nasal and sinus congestion. (Note: Do not use decongestants if you have high blood  pressure.)  Follow-up care  Follow up with your healthcare provider, or as advised.  When to seek medical advice  Call your healthcare provider right away if any of these occur:  · Cough with lots of colored sputum (mucus)  · Severe headache; face, neck, or ear pain  · Difficulty swallowing due to throat pain  · Fever of 100.4°F (38°C)  Call 911, or get immediate medical care  Call emergency services right away if any of these occur:  · Chest pain, shortness of breath, wheezing, or difficulty breathing  · Coughing up blood  · Inability to swallow due to throat pain  Date Last Reviewed: 9/13/2015  © 6672-7421 [employer]. [address] [place] [address], [place] [address]. All rights reserved. This information is not intended as a substitute for professional medical care. Always follow your healthcare professional's instructions.

## 2020-10-16 NOTE — PATIENT INSTRUCTIONS
Viral Upper Respiratory Illness (Adult)  You have a viral upper respiratory illness (URI), which is another term for the common cold. This illness is contagious during the first few days. It is spread through the air by coughing and sneezing. It may also be spread by direct contact (touching the sick person and then touching your own eyes, nose, or mouth). Frequent handwashing will decrease risk of spread. Most viral illnesses go away within 7 to 10 days with rest and simple home remedies. Sometimes the illness may last for several weeks. Antibiotics will not kill a virus, and they are generally not prescribed for this condition.    Home care  · If symptoms are severe, rest at home for the first 2 to 3 days. When you resume activity, don't let yourself get too tired.  · Avoid being exposed to cigarette smoke (yours or others).  · You may use acetaminophen or ibuprofen to control pain and fever, unless another medicine was prescribed. (Note: If you have chronic liver or kidney disease, have ever had a stomach ulcer or gastrointestinal bleeding, or are taking blood-thinning medicines, talk with your healthcare provider before using these medicines.) Aspirin should never be given to anyone under 18 years of age who is ill with a viral infection or fever. It may cause severe liver or brain damage.  · Your appetite may be poor, so a light diet is fine. Avoid dehydration by drinking 6 to 8 glasses of fluids per day (water, soft drinks, juices, tea, or soup). Extra fluids will help loosen secretions in the nose and lungs.  · Over-the-counter cold medicines will not shorten the length of time youre sick, but they may be helpful for the following symptoms: cough, sore throat, and nasal and sinus congestion. (Note: Do not use decongestants if you have high blood pressure.)  Follow-up care  Follow up with your healthcare provider, or as advised.  When to seek medical advice  Call your healthcare provider right away if any  of these occur:  · Cough with lots of colored sputum (mucus)  · Severe headache; face, neck, or ear pain  · Difficulty swallowing due to throat pain  · Fever of 100.4°F (38°C)  Call 911, or get immediate medical care  Call emergency services right away if any of these occur:  · Chest pain, shortness of breath, wheezing, or difficulty breathing  · Coughing up blood  · Inability to swallow due to throat pain  Date Last Reviewed: 9/13/2015  © 5196-5112 [employer]. [address] [place] [address]. All rights reserved. This information is not intended as a substitute for professional medical care. Always follow your healthcare professional's instructions.

## 2021-01-18 ENCOUNTER — CLINICAL SUPPORT (OUTPATIENT)
Dept: URGENT CARE | Facility: CLINIC | Age: 41
End: 2021-01-18
Payer: COMMERCIAL

## 2021-01-18 VITALS — HEART RATE: 75 BPM | OXYGEN SATURATION: 99 % | TEMPERATURE: 98 F

## 2021-01-18 DIAGNOSIS — R09.81 SINUS CONGESTION: Primary | ICD-10-CM

## 2021-01-18 DIAGNOSIS — Z11.52 ENCOUNTER FOR SCREENING LABORATORY TESTING FOR COVID-19 VIRUS: ICD-10-CM

## 2021-01-18 LAB
CTP QC/QA: YES
SARS-COV-2 RDRP RESP QL NAA+PROBE: NEGATIVE

## 2021-01-18 PROCEDURE — U0002 COVID-19 LAB TEST NON-CDC: HCPCS | Mod: QW,S$GLB,, | Performed by: PHYSICIAN ASSISTANT

## 2021-01-18 PROCEDURE — U0002: ICD-10-PCS | Mod: QW,S$GLB,, | Performed by: PHYSICIAN ASSISTANT

## 2021-02-02 ENCOUNTER — OFFICE VISIT (OUTPATIENT)
Dept: FAMILY MEDICINE | Facility: CLINIC | Age: 41
End: 2021-02-02
Payer: COMMERCIAL

## 2021-02-02 DIAGNOSIS — J30.2 SEASONAL ALLERGIC RHINITIS, UNSPECIFIED TRIGGER: Primary | ICD-10-CM

## 2021-02-02 DIAGNOSIS — J33.9 NASAL POLYPOSIS: ICD-10-CM

## 2021-02-02 PROCEDURE — 99213 OFFICE O/P EST LOW 20 MIN: CPT | Mod: 95,,, | Performed by: FAMILY MEDICINE

## 2021-02-02 PROCEDURE — 99213 PR OFFICE/OUTPT VISIT, EST, LEVL III, 20-29 MIN: ICD-10-PCS | Mod: 95,,, | Performed by: FAMILY MEDICINE

## 2021-02-05 ENCOUNTER — PATIENT MESSAGE (OUTPATIENT)
Dept: FAMILY MEDICINE | Facility: CLINIC | Age: 41
End: 2021-02-05

## 2021-02-05 RX ORDER — PREDNISONE 20 MG/1
20 TABLET ORAL DAILY
Qty: 5 TABLET | Refills: 0 | Status: SHIPPED | OUTPATIENT
Start: 2021-02-05 | End: 2021-02-10

## 2021-02-05 RX ORDER — AMOXICILLIN 500 MG/1
1000 CAPSULE ORAL 2 TIMES DAILY
Qty: 28 CAPSULE | Refills: 0 | Status: SHIPPED | OUTPATIENT
Start: 2021-02-05 | End: 2021-02-12

## 2021-05-04 RX ORDER — ESCITALOPRAM OXALATE 20 MG/1
TABLET ORAL
Qty: 90 TABLET | Refills: 0 | OUTPATIENT
Start: 2021-05-04

## 2021-05-04 RX ORDER — ESCITALOPRAM OXALATE 20 MG/1
20 TABLET ORAL DAILY
Qty: 90 TABLET | Refills: 0 | Status: SHIPPED | OUTPATIENT
Start: 2021-05-04 | End: 2021-09-20 | Stop reason: SDUPTHER

## 2021-05-07 ENCOUNTER — PATIENT OUTREACH (OUTPATIENT)
Dept: ADMINISTRATIVE | Facility: HOSPITAL | Age: 41
End: 2021-05-07

## 2021-05-25 ENCOUNTER — LAB VISIT (OUTPATIENT)
Dept: LAB | Facility: HOSPITAL | Age: 41
End: 2021-05-25
Attending: OBSTETRICS & GYNECOLOGY
Payer: COMMERCIAL

## 2021-05-25 ENCOUNTER — OFFICE VISIT (OUTPATIENT)
Dept: OBSTETRICS AND GYNECOLOGY | Facility: CLINIC | Age: 41
End: 2021-05-25
Payer: COMMERCIAL

## 2021-05-25 VITALS
HEIGHT: 64 IN | BODY MASS INDEX: 22.92 KG/M2 | SYSTOLIC BLOOD PRESSURE: 110 MMHG | DIASTOLIC BLOOD PRESSURE: 64 MMHG | WEIGHT: 134.25 LBS

## 2021-05-25 DIAGNOSIS — N64.52 NIPPLE DISCHARGE IN FEMALE: ICD-10-CM

## 2021-05-25 DIAGNOSIS — N64.4 BREAST PAIN, LEFT: Primary | ICD-10-CM

## 2021-05-25 PROCEDURE — 84443 ASSAY THYROID STIM HORMONE: CPT | Performed by: OBSTETRICS & GYNECOLOGY

## 2021-05-25 PROCEDURE — 99999 PR PBB SHADOW E&M-EST. PATIENT-LVL III: ICD-10-PCS | Mod: PBBFAC,,, | Performed by: OBSTETRICS & GYNECOLOGY

## 2021-05-25 PROCEDURE — 99203 PR OFFICE/OUTPT VISIT, NEW, LEVL III, 30-44 MIN: ICD-10-PCS | Mod: S$GLB,,, | Performed by: OBSTETRICS & GYNECOLOGY

## 2021-05-25 PROCEDURE — 3008F PR BODY MASS INDEX (BMI) DOCUMENTED: ICD-10-PCS | Mod: CPTII,S$GLB,, | Performed by: OBSTETRICS & GYNECOLOGY

## 2021-05-25 PROCEDURE — 3008F BODY MASS INDEX DOCD: CPT | Mod: CPTII,S$GLB,, | Performed by: OBSTETRICS & GYNECOLOGY

## 2021-05-25 PROCEDURE — 99999 PR PBB SHADOW E&M-EST. PATIENT-LVL III: CPT | Mod: PBBFAC,,, | Performed by: OBSTETRICS & GYNECOLOGY

## 2021-05-25 PROCEDURE — 99203 OFFICE O/P NEW LOW 30 MIN: CPT | Mod: S$GLB,,, | Performed by: OBSTETRICS & GYNECOLOGY

## 2021-05-25 PROCEDURE — 84146 ASSAY OF PROLACTIN: CPT | Performed by: OBSTETRICS & GYNECOLOGY

## 2021-05-25 PROCEDURE — 84702 CHORIONIC GONADOTROPIN TEST: CPT | Performed by: OBSTETRICS & GYNECOLOGY

## 2021-05-25 PROCEDURE — 36415 COLL VENOUS BLD VENIPUNCTURE: CPT | Mod: PO | Performed by: OBSTETRICS & GYNECOLOGY

## 2021-05-25 PROCEDURE — 1126F PR PAIN SEVERITY QUANTIFIED, NO PAIN PRESENT: ICD-10-PCS | Mod: S$GLB,,, | Performed by: OBSTETRICS & GYNECOLOGY

## 2021-05-25 PROCEDURE — 1126F AMNT PAIN NOTED NONE PRSNT: CPT | Mod: S$GLB,,, | Performed by: OBSTETRICS & GYNECOLOGY

## 2021-05-26 ENCOUNTER — OFFICE VISIT (OUTPATIENT)
Dept: URGENT CARE | Facility: CLINIC | Age: 41
End: 2021-05-26
Payer: COMMERCIAL

## 2021-05-26 VITALS
HEART RATE: 70 BPM | TEMPERATURE: 98 F | HEIGHT: 64 IN | WEIGHT: 134 LBS | OXYGEN SATURATION: 98 % | DIASTOLIC BLOOD PRESSURE: 65 MMHG | BODY MASS INDEX: 22.88 KG/M2 | RESPIRATION RATE: 16 BRPM | SYSTOLIC BLOOD PRESSURE: 100 MMHG

## 2021-05-26 DIAGNOSIS — R05.9 COUGH: Primary | ICD-10-CM

## 2021-05-26 DIAGNOSIS — R09.81 SINUS CONGESTION: ICD-10-CM

## 2021-05-26 LAB
CTP QC/QA: YES
HCG INTACT+B SERPL-ACNC: <1.2 MIU/ML
PROLACTIN SERPL IA-MCNC: 9.3 NG/ML (ref 5.2–26.5)
SARS-COV-2 RDRP RESP QL NAA+PROBE: NEGATIVE
TSH SERPL DL<=0.005 MIU/L-ACNC: 2.97 UIU/ML (ref 0.4–4)

## 2021-05-26 PROCEDURE — 99213 PR OFFICE/OUTPT VISIT, EST, LEVL III, 20-29 MIN: ICD-10-PCS | Mod: S$GLB,,, | Performed by: INTERNAL MEDICINE

## 2021-05-26 PROCEDURE — 3008F BODY MASS INDEX DOCD: CPT | Mod: CPTII,S$GLB,, | Performed by: INTERNAL MEDICINE

## 2021-05-26 PROCEDURE — U0002: ICD-10-PCS | Mod: QW,S$GLB,, | Performed by: INTERNAL MEDICINE

## 2021-05-26 PROCEDURE — 3008F PR BODY MASS INDEX (BMI) DOCUMENTED: ICD-10-PCS | Mod: CPTII,S$GLB,, | Performed by: INTERNAL MEDICINE

## 2021-05-26 PROCEDURE — U0002 COVID-19 LAB TEST NON-CDC: HCPCS | Mod: QW,S$GLB,, | Performed by: INTERNAL MEDICINE

## 2021-05-26 PROCEDURE — 99213 OFFICE O/P EST LOW 20 MIN: CPT | Mod: S$GLB,,, | Performed by: INTERNAL MEDICINE

## 2021-05-31 ENCOUNTER — TELEPHONE (OUTPATIENT)
Dept: OBSTETRICS AND GYNECOLOGY | Facility: CLINIC | Age: 41
End: 2021-05-31

## 2021-05-31 DIAGNOSIS — N64.4 BREAST PAIN, LEFT: Primary | ICD-10-CM

## 2021-05-31 DIAGNOSIS — R92.8 ABNORMAL MAMMOGRAM: ICD-10-CM

## 2021-06-02 ENCOUNTER — TELEPHONE (OUTPATIENT)
Dept: OBSTETRICS AND GYNECOLOGY | Facility: CLINIC | Age: 41
End: 2021-06-02

## 2021-06-02 DIAGNOSIS — N61.1 BREAST ABSCESS: Primary | ICD-10-CM

## 2021-06-02 RX ORDER — DICLOXACILLIN SODIUM 500 MG/1
500 CAPSULE ORAL 4 TIMES DAILY
Qty: 40 CAPSULE | Refills: 0 | Status: SHIPPED | OUTPATIENT
Start: 2021-06-02 | End: 2021-06-12

## 2021-09-17 ENCOUNTER — PATIENT MESSAGE (OUTPATIENT)
Dept: FAMILY MEDICINE | Facility: CLINIC | Age: 41
End: 2021-09-17

## 2021-09-20 RX ORDER — ESCITALOPRAM OXALATE 20 MG/1
20 TABLET ORAL DAILY
Qty: 30 TABLET | Refills: 0 | Status: SHIPPED | OUTPATIENT
Start: 2021-09-20

## 2024-07-26 NOTE — TELEPHONE ENCOUNTER
----- Message from Cheryle Quintana sent at 10/23/2017  8:31 AM CDT -----  Will check tomorrow and let her know.     From: Deborah Colon  Sent: 10/23/2017   8:18 AM  To: Xiomara Rand, Cheryle Aubrey    Can you check and see if you have trials for this patient and let me know so I can call her?  Thanks, Deborah  ----- Message -----  From: Arvin Damian OD  Sent: 10/23/2017   8:13 AM  To: Deborah Colon    Yes, you can send message to [place] so they can check on trials.   ----- Message -----  From: Deborah Colon  Sent: 10/20/2017   3:06 PM  To: Arvin Damian, [email or web address]    I scheduled an appointment for her in [place] for 12/06. But she is down to her last pair of contacts can we give her a trial pair to  in [place]? She's around a -7.00, so I don't know if we even have that in trials.  ----- Message -----  From: Christine Ambar  Sent: 10/20/2017   2:45 PM  To: Rickie Reveles    Patient states that she need to see the doctor as soon as possible for an annual eye exam and contacts.  Please call patient [phone].     Detail Level: Detailed